# Patient Record
Sex: FEMALE | Race: BLACK OR AFRICAN AMERICAN | Employment: OTHER | URBAN - METROPOLITAN AREA
[De-identification: names, ages, dates, MRNs, and addresses within clinical notes are randomized per-mention and may not be internally consistent; named-entity substitution may affect disease eponyms.]

---

## 2017-02-23 ENCOUNTER — HOSPITAL ENCOUNTER (OUTPATIENT)
Dept: MRI IMAGING | Age: 72
Discharge: HOME | End: 2017-02-23
Attending: NURSE PRACTITIONER
Payer: MEDICARE

## 2017-02-23 ENCOUNTER — HOSPITAL ENCOUNTER (OUTPATIENT)
Dept: LAB | Age: 72
Discharge: HOME | End: 2017-02-23
Attending: NURSE PRACTITIONER
Payer: MEDICARE

## 2017-02-23 DIAGNOSIS — R55 NEAR SYNCOPE: ICD-10-CM

## 2017-02-23 DIAGNOSIS — R42 DIZZINESS: ICD-10-CM

## 2017-02-23 LAB
ANION GAP BLD CALC-SCNC: 12 MMOL/L (ref 7–16)
BASOPHILS # BLD AUTO: 0 K/UL (ref 0–0.2)
BASOPHILS # BLD: 0 % (ref 0–2)
BUN SERPL-MCNC: 12 MG/DL (ref 8–23)
CALCIUM SERPL-MCNC: 9.2 MG/DL (ref 8.3–10.4)
CHLORIDE SERPL-SCNC: 103 MMOL/L (ref 98–107)
CO2 SERPL-SCNC: 27 MMOL/L (ref 21–32)
CREAT SERPL-MCNC: 0.9 MG/DL (ref 0.6–1)
DIFFERENTIAL METHOD BLD: ABNORMAL
EOSINOPHIL # BLD: 0.1 K/UL (ref 0–0.8)
EOSINOPHIL NFR BLD: 2 % (ref 0.5–7.8)
ERYTHROCYTE [DISTWIDTH] IN BLOOD BY AUTOMATED COUNT: 13.2 % (ref 11.9–14.6)
GLUCOSE SERPL-MCNC: 132 MG/DL (ref 65–100)
HCT VFR BLD AUTO: 46.4 % (ref 35.8–46.3)
HGB BLD-MCNC: 15.7 G/DL (ref 11.7–15.4)
IMM GRANULOCYTES # BLD: 0 K/UL (ref 0–0.5)
IMM GRANULOCYTES NFR BLD AUTO: 0.2 % (ref 0–5)
LYMPHOCYTES # BLD AUTO: 37 % (ref 13–44)
LYMPHOCYTES # BLD: 2.2 K/UL (ref 0.5–4.6)
MCH RBC QN AUTO: 32.6 PG (ref 26.1–32.9)
MCHC RBC AUTO-ENTMCNC: 33.8 G/DL (ref 31.4–35)
MCV RBC AUTO: 96.5 FL (ref 79.6–97.8)
MONOCYTES # BLD: 0.2 K/UL (ref 0.1–1.3)
MONOCYTES NFR BLD AUTO: 3 % (ref 4–12)
NEUTS SEG # BLD: 3.5 K/UL (ref 1.7–8.2)
NEUTS SEG NFR BLD AUTO: 58 % (ref 43–78)
PLATELET # BLD AUTO: 219 K/UL (ref 150–450)
PMV BLD AUTO: 10.4 FL (ref 10.8–14.1)
POTASSIUM SERPL-SCNC: 4 MMOL/L (ref 3.5–5.1)
RBC # BLD AUTO: 4.81 M/UL (ref 4.05–5.25)
SODIUM SERPL-SCNC: 142 MMOL/L (ref 136–145)
T4 FREE SERPL-MCNC: 0.9 NG/DL (ref 0.78–1.46)
TSH SERPL DL<=0.005 MIU/L-ACNC: 0.82 UIU/ML (ref 0.36–3.74)
WBC # BLD AUTO: 6.1 K/UL (ref 4.3–11.1)

## 2017-02-23 PROCEDURE — 70551 MRI BRAIN STEM W/O DYE: CPT

## 2017-02-23 PROCEDURE — 84443 ASSAY THYROID STIM HORMONE: CPT | Performed by: NURSE PRACTITIONER

## 2017-02-23 PROCEDURE — 80048 BASIC METABOLIC PNL TOTAL CA: CPT | Performed by: NURSE PRACTITIONER

## 2017-02-23 PROCEDURE — 85025 COMPLETE CBC W/AUTO DIFF WBC: CPT | Performed by: NURSE PRACTITIONER

## 2017-02-23 PROCEDURE — 36415 COLL VENOUS BLD VENIPUNCTURE: CPT | Performed by: NURSE PRACTITIONER

## 2017-02-23 PROCEDURE — 84439 ASSAY OF FREE THYROXINE: CPT | Performed by: NURSE PRACTITIONER

## 2017-02-23 NOTE — PROGRESS NOTES
MRI shows no stroke. That does not mean that you didn't have a mini stroke. Those do not cause any permanent damage, and therefore sometimes cannot be seen on scans. The MRI does show that you have fluid build up behind your ear. This may represent a ear infection or it may always be this way. If you feel your ear is worse than it normally is, we could try a round of antibiotics. You let me know if you would like me to send some in for you. Otherwise, keep fu with gh next week to discuss a few more tests that we may need to do. Also discuss options to help you stop smoking. Let me know if you have any questions/concerns. Go to the ER immediately with any recurring or worsening sx.

## 2017-04-20 PROBLEM — J33.8 MAXILLARY POLYP OF SINUS: Status: ACTIVE | Noted: 2017-04-20

## 2017-04-20 PROBLEM — M54.32 LEFT SIDED SCIATICA: Status: ACTIVE | Noted: 2017-04-20

## 2017-11-30 ENCOUNTER — HOSPITAL ENCOUNTER (OUTPATIENT)
Age: 72
Setting detail: OBSERVATION
Discharge: HOME | End: 2017-11-30
Attending: EMERGENCY MEDICINE | Admitting: INTERNAL MEDICINE
Payer: MEDICARE

## 2017-11-30 ENCOUNTER — APPOINTMENT (OUTPATIENT)
Dept: GENERAL RADIOLOGY | Age: 72
End: 2017-11-30
Attending: EMERGENCY MEDICINE
Payer: MEDICARE

## 2017-11-30 VITALS
DIASTOLIC BLOOD PRESSURE: 87 MMHG | TEMPERATURE: 98.5 F | WEIGHT: 187 LBS | HEART RATE: 59 BPM | SYSTOLIC BLOOD PRESSURE: 142 MMHG | OXYGEN SATURATION: 97 % | HEIGHT: 66 IN | BODY MASS INDEX: 30.05 KG/M2 | RESPIRATION RATE: 16 BRPM

## 2017-11-30 DIAGNOSIS — R07.9 CHEST PAIN, UNSPECIFIED TYPE: Primary | ICD-10-CM

## 2017-11-30 PROBLEM — Z86.73 HISTORY OF TIA (TRANSIENT ISCHEMIC ATTACK): Status: ACTIVE | Noted: 2017-11-30

## 2017-11-30 PROBLEM — E78.5 DYSLIPIDEMIA: Status: ACTIVE | Noted: 2017-11-30

## 2017-11-30 LAB
ALBUMIN SERPL-MCNC: 3.3 G/DL (ref 3.2–4.6)
ALBUMIN/GLOB SERPL: 0.8 {RATIO} (ref 1.2–3.5)
ALP SERPL-CCNC: 78 U/L (ref 50–136)
ALT SERPL-CCNC: 17 U/L (ref 12–65)
ANION GAP SERPL CALC-SCNC: 5 MMOL/L (ref 7–16)
AST SERPL-CCNC: 13 U/L (ref 15–37)
ATRIAL RATE: 60 BPM
BASOPHILS # BLD: 0 K/UL (ref 0–0.2)
BASOPHILS NFR BLD: 0 % (ref 0–2)
BILIRUB SERPL-MCNC: 0.6 MG/DL (ref 0.2–1.1)
BUN SERPL-MCNC: 12 MG/DL (ref 8–23)
CALCIUM SERPL-MCNC: 9.2 MG/DL (ref 8.3–10.4)
CALCULATED P AXIS, ECG09: 59 DEGREES
CALCULATED R AXIS, ECG10: -43 DEGREES
CALCULATED T AXIS, ECG11: 115 DEGREES
CHLORIDE SERPL-SCNC: 108 MMOL/L (ref 98–107)
CO2 SERPL-SCNC: 28 MMOL/L (ref 21–32)
CREAT SERPL-MCNC: 0.83 MG/DL (ref 0.6–1)
DIAGNOSIS, 93000: NORMAL
DIFFERENTIAL METHOD BLD: ABNORMAL
EOSINOPHIL # BLD: 0.1 K/UL (ref 0–0.8)
EOSINOPHIL NFR BLD: 2 % (ref 0.5–7.8)
ERYTHROCYTE [DISTWIDTH] IN BLOOD BY AUTOMATED COUNT: 14.3 % (ref 11.9–14.6)
GLOBULIN SER CALC-MCNC: 3.9 G/DL (ref 2.3–3.5)
GLUCOSE SERPL-MCNC: 77 MG/DL (ref 65–100)
HCT VFR BLD AUTO: 45.5 % (ref 35.8–46.3)
HGB BLD-MCNC: 15.6 G/DL (ref 11.7–15.4)
IMM GRANULOCYTES # BLD: 0 K/UL (ref 0–0.5)
IMM GRANULOCYTES NFR BLD AUTO: 0 % (ref 0–5)
LYMPHOCYTES # BLD: 2 K/UL (ref 0.5–4.6)
LYMPHOCYTES NFR BLD: 40 % (ref 13–44)
MCH RBC QN AUTO: 33.5 PG (ref 26.1–32.9)
MCHC RBC AUTO-ENTMCNC: 34.3 G/DL (ref 31.4–35)
MCV RBC AUTO: 97.6 FL (ref 79.6–97.8)
MONOCYTES # BLD: 0.3 K/UL (ref 0.1–1.3)
MONOCYTES NFR BLD: 5 % (ref 4–12)
NEUTS SEG # BLD: 2.6 K/UL (ref 1.7–8.2)
NEUTS SEG NFR BLD: 53 % (ref 43–78)
P-R INTERVAL, ECG05: 104 MS
PLATELET # BLD AUTO: 260 K/UL (ref 150–450)
PMV BLD AUTO: 10.8 FL (ref 10.8–14.1)
POTASSIUM SERPL-SCNC: 5 MMOL/L (ref 3.5–5.1)
PROT SERPL-MCNC: 7.2 G/DL (ref 6.3–8.2)
Q-T INTERVAL, ECG07: 466 MS
QRS DURATION, ECG06: 132 MS
QTC CALCULATION (BEZET), ECG08: 466 MS
RBC # BLD AUTO: 4.66 M/UL (ref 4.05–5.25)
SODIUM SERPL-SCNC: 141 MMOL/L (ref 136–145)
TROPONIN I SERPL-MCNC: <0.02 NG/ML (ref 0.02–0.05)
VENTRICULAR RATE, ECG03: 60 BPM
WBC # BLD AUTO: 4.9 K/UL (ref 4.3–11.1)

## 2017-11-30 PROCEDURE — 96366 THER/PROPH/DIAG IV INF ADDON: CPT | Performed by: EMERGENCY MEDICINE

## 2017-11-30 PROCEDURE — 77030015766

## 2017-11-30 PROCEDURE — 74011250636 HC RX REV CODE- 250/636

## 2017-11-30 PROCEDURE — 74011250636 HC RX REV CODE- 250/636: Performed by: NURSE PRACTITIONER

## 2017-11-30 PROCEDURE — 99284 EMERGENCY DEPT VISIT MOD MDM: CPT | Performed by: EMERGENCY MEDICINE

## 2017-11-30 PROCEDURE — 80053 COMPREHEN METABOLIC PANEL: CPT | Performed by: EMERGENCY MEDICINE

## 2017-11-30 PROCEDURE — 85025 COMPLETE CBC W/AUTO DIFF WBC: CPT | Performed by: EMERGENCY MEDICINE

## 2017-11-30 PROCEDURE — 71020 XR CHEST PA LAT: CPT

## 2017-11-30 PROCEDURE — C1894 INTRO/SHEATH, NON-LASER: HCPCS

## 2017-11-30 PROCEDURE — 74011636320 HC RX REV CODE- 636/320: Performed by: INTERNAL MEDICINE

## 2017-11-30 PROCEDURE — 74011250636 HC RX REV CODE- 250/636: Performed by: INTERNAL MEDICINE

## 2017-11-30 PROCEDURE — 84484 ASSAY OF TROPONIN QUANT: CPT | Performed by: EMERGENCY MEDICINE

## 2017-11-30 PROCEDURE — 74011000250 HC RX REV CODE- 250: Performed by: INTERNAL MEDICINE

## 2017-11-30 PROCEDURE — 99153 MOD SED SAME PHYS/QHP EA: CPT

## 2017-11-30 PROCEDURE — 93005 ELECTROCARDIOGRAM TRACING: CPT | Performed by: EMERGENCY MEDICINE

## 2017-11-30 PROCEDURE — 74011250637 HC RX REV CODE- 250/637: Performed by: EMERGENCY MEDICINE

## 2017-11-30 PROCEDURE — 77030029997 HC DEV COM RDL R BND TELE -B

## 2017-11-30 PROCEDURE — C1769 GUIDE WIRE: HCPCS

## 2017-11-30 PROCEDURE — 99218 HC RM OBSERVATION: CPT

## 2017-11-30 PROCEDURE — 99152 MOD SED SAME PHYS/QHP 5/>YRS: CPT

## 2017-11-30 PROCEDURE — 96365 THER/PROPH/DIAG IV INF INIT: CPT | Performed by: EMERGENCY MEDICINE

## 2017-11-30 PROCEDURE — 93458 L HRT ARTERY/VENTRICLE ANGIO: CPT

## 2017-11-30 RX ORDER — SODIUM CHLORIDE 0.9 % (FLUSH) 0.9 %
5-10 SYRINGE (ML) INJECTION AS NEEDED
Status: CANCELLED | OUTPATIENT
Start: 2017-11-30

## 2017-11-30 RX ORDER — PANTOPRAZOLE SODIUM 40 MG/1
40 TABLET, DELAYED RELEASE ORAL DAILY
Qty: 30 TAB | Refills: 1 | Status: SHIPPED | OUTPATIENT
Start: 2017-11-30 | End: 2018-02-26

## 2017-11-30 RX ORDER — PRAVASTATIN SODIUM 20 MG/1
40 TABLET ORAL
Status: CANCELLED | OUTPATIENT
Start: 2017-11-30

## 2017-11-30 RX ORDER — MORPHINE SULFATE 2 MG/ML
2 INJECTION, SOLUTION INTRAMUSCULAR; INTRAVENOUS
Status: CANCELLED | OUTPATIENT
Start: 2017-11-30

## 2017-11-30 RX ORDER — TIZANIDINE 2 MG/1
2 TABLET ORAL
Status: CANCELLED | OUTPATIENT
Start: 2017-11-30

## 2017-11-30 RX ORDER — ACETAMINOPHEN 500 MG
500 TABLET ORAL
Status: CANCELLED | OUTPATIENT
Start: 2017-11-30

## 2017-11-30 RX ORDER — HEPARIN SODIUM 5000 [USP'U]/ML
4000 INJECTION, SOLUTION INTRAVENOUS; SUBCUTANEOUS ONCE
Status: COMPLETED | OUTPATIENT
Start: 2017-11-30 | End: 2017-11-30

## 2017-11-30 RX ORDER — SODIUM CHLORIDE 9 MG/ML
75 INJECTION, SOLUTION INTRAVENOUS CONTINUOUS
Status: DISCONTINUED | OUTPATIENT
Start: 2017-11-30 | End: 2017-12-01 | Stop reason: HOSPADM

## 2017-11-30 RX ORDER — LISINOPRIL AND HYDROCHLOROTHIAZIDE 10; 12.5 MG/1; MG/1
1 TABLET ORAL DAILY
Status: CANCELLED | OUTPATIENT
Start: 2017-12-01

## 2017-11-30 RX ORDER — GUAIFENESIN 100 MG/5ML
324 LIQUID (ML) ORAL
Status: COMPLETED | OUTPATIENT
Start: 2017-11-30 | End: 2017-11-30

## 2017-11-30 RX ORDER — HEPARIN SODIUM 200 [USP'U]/100ML
3 INJECTION, SOLUTION INTRAVENOUS CONTINUOUS
Status: DISCONTINUED | OUTPATIENT
Start: 2017-11-30 | End: 2017-12-01 | Stop reason: HOSPADM

## 2017-11-30 RX ORDER — GUAIFENESIN 100 MG/5ML
81 LIQUID (ML) ORAL DAILY
Status: CANCELLED | OUTPATIENT
Start: 2017-12-01

## 2017-11-30 RX ORDER — SODIUM CHLORIDE 0.9 % (FLUSH) 0.9 %
5-10 SYRINGE (ML) INJECTION EVERY 8 HOURS
Status: CANCELLED | OUTPATIENT
Start: 2017-11-30

## 2017-11-30 RX ORDER — SODIUM CHLORIDE 9 MG/ML
75 INJECTION, SOLUTION INTRAVENOUS CONTINUOUS
Status: CANCELLED | OUTPATIENT
Start: 2017-11-30

## 2017-11-30 RX ORDER — FENTANYL CITRATE 50 UG/ML
25-50 INJECTION, SOLUTION INTRAMUSCULAR; INTRAVENOUS
Status: DISCONTINUED | OUTPATIENT
Start: 2017-11-30 | End: 2017-12-01 | Stop reason: HOSPADM

## 2017-11-30 RX ORDER — MIDAZOLAM HYDROCHLORIDE 1 MG/ML
.5-2 INJECTION, SOLUTION INTRAMUSCULAR; INTRAVENOUS
Status: DISCONTINUED | OUTPATIENT
Start: 2017-11-30 | End: 2017-12-01 | Stop reason: HOSPADM

## 2017-11-30 RX ORDER — FAMOTIDINE 20 MG/1
20 TABLET, FILM COATED ORAL 2 TIMES DAILY
Status: CANCELLED | OUTPATIENT
Start: 2017-11-30

## 2017-11-30 RX ORDER — TRAMADOL HYDROCHLORIDE 50 MG/1
50 TABLET ORAL
Status: CANCELLED | OUTPATIENT
Start: 2017-11-30

## 2017-11-30 RX ORDER — CLOPIDOGREL BISULFATE 75 MG/1
75 TABLET ORAL DAILY
Status: CANCELLED | OUTPATIENT
Start: 2017-12-01

## 2017-11-30 RX ORDER — NITROGLYCERIN 0.4 MG/1
0.4 TABLET SUBLINGUAL
Status: CANCELLED | OUTPATIENT
Start: 2017-11-30

## 2017-11-30 RX ORDER — HEPARIN SODIUM 5000 [USP'U]/100ML
12-25 INJECTION, SOLUTION INTRAVENOUS
Status: DISCONTINUED | OUTPATIENT
Start: 2017-11-30 | End: 2017-12-01 | Stop reason: HOSPADM

## 2017-11-30 RX ORDER — LIDOCAINE HYDROCHLORIDE 20 MG/ML
1-20 INJECTION, SOLUTION INFILTRATION; PERINEURAL
Status: DISCONTINUED | OUTPATIENT
Start: 2017-11-30 | End: 2017-12-01 | Stop reason: HOSPADM

## 2017-11-30 RX ADMIN — HEPARIN SODIUM 4000 UNITS: 5000 INJECTION, SOLUTION INTRAVENOUS; SUBCUTANEOUS at 11:16

## 2017-11-30 RX ADMIN — IOPAMIDOL 110 ML: 755 INJECTION, SOLUTION INTRAVENOUS at 14:22

## 2017-11-30 RX ADMIN — LIDOCAINE HYDROCHLORIDE 60 MG: 20 INJECTION, SOLUTION INFILTRATION; PERINEURAL at 14:08

## 2017-11-30 RX ADMIN — HEPARIN SODIUM 3 ML/HR: 200 INJECTION, SOLUTION INTRAVENOUS at 13:48

## 2017-11-30 RX ADMIN — SODIUM CHLORIDE 75 ML/HR: 900 INJECTION, SOLUTION INTRAVENOUS at 11:14

## 2017-11-30 RX ADMIN — MIDAZOLAM HYDROCHLORIDE 1 MG: 1 INJECTION, SOLUTION INTRAMUSCULAR; INTRAVENOUS at 14:02

## 2017-11-30 RX ADMIN — FENTANYL CITRATE 25 MCG: 50 INJECTION, SOLUTION INTRAMUSCULAR; INTRAVENOUS at 14:08

## 2017-11-30 RX ADMIN — HEPARIN SODIUM AND DEXTROSE 12 UNITS/KG/HR: 5000; 5 INJECTION INTRAVENOUS at 11:17

## 2017-11-30 RX ADMIN — FENTANYL CITRATE 25 MCG: 50 INJECTION, SOLUTION INTRAMUSCULAR; INTRAVENOUS at 14:02

## 2017-11-30 RX ADMIN — MIDAZOLAM HYDROCHLORIDE 1 MG: 1 INJECTION, SOLUTION INTRAMUSCULAR; INTRAVENOUS at 14:08

## 2017-11-30 RX ADMIN — HEPARIN SODIUM 2 ML: 10000 INJECTION, SOLUTION INTRAVENOUS; SUBCUTANEOUS at 14:08

## 2017-11-30 RX ADMIN — ASPIRIN 81 MG 324 MG: 81 TABLET ORAL at 10:33

## 2017-11-30 NOTE — ED PROVIDER NOTES
HPI:  74 female history of COPD, hypertension, hyperlipidemia and similar complaint of intermittent chest pain for the past one month. No direct correlation with exertion. Sometimes at rest sometime with exertion. No nausea vomiting, diarrhea. Denies any pleuritic component. No coughing, recent travel. No burn in sensation when laying down or first thing in the morning. Currently chest pain-free however had chest pain earlier this morning. Felt like a \"twinge\"    ROS  Constitutional: No fever  Skin: no rash  Eye: No vision changes  ENMT: No sore throat, no congestion  Respiratory: No shortness of breath, no cough  Cardiovascular: + chest pain, no palpitations  Gastrointestinal: No vomiting, no nausea, no diarrhea, no constipation, no rectal bleeding, no abdominal pain  : No dysuria, no hematuria  MSK: No back pain, no muscle pain, no joint pain  Neuro: No headache, no change in mental status, no numbness, no tingling, no weakness  Psych: No anxiety, no depression  Endocrine: No hyperglycemia  All other review of systems positive per history of present illness and the above otherwise negative or noncontributory.     Visit Vitals    /87 (BP 1 Location: Left arm, BP Patient Position: At rest)    Pulse 62    Temp 98.5 °F (36.9 °C)    Resp 20    Ht 5' 6\" (1.676 m)    Wt 84.8 kg (187 lb)    SpO2 98%    BMI 30.18 kg/m2     Past Medical History:   Diagnosis Date    Anxiety state, unspecified     Breast cancer ([address]     Chronic obstructive pulmonary disease (HCC)     CTS (carpal tunnel syndrome) 6/09/4351    Diastolic dysfunction     Diverticulosis     Helicobacter pylori (H. pylori) 07/27/2012    Hypertension     Mixed hyperlipidemia     Myalgia and myositis, unspecified     Obesity     Overweight(278.02)     Palpitations     Pulmonary hypertension due to COPD ([address]     Rectal polyp     Stroke ([address]     x2    TIA (transient ischemic attack)     Tobacco use disorder     Type A WPW syndrome      Past Surgical History:   Procedure Laterality Date    HX HYSTERECTOMY      Vaginal for fibroid tumors    HX MASTECTOMY Bilateral     Breast cancer    HX OTHER SURGICAL      Thyroid     Prior to Admission Medications   Prescriptions Last Dose Informant Patient Reported? Taking?   acetaminophen (TYLENOL EXTRA STRENGTH) 500 mg tablet   Yes No   Sig: Take 500 mg by mouth every six (6) hours as needed. clopidogrel (PLAVIX) 75 mg tab   No No   Sig: TAKE 1 TABLET EVERY DAY   fluticasone (FLONASE) 50 mcg/actuation nasal spray   No No   Si Sprays by Both Nostrils route daily. lisinopril-hydroCHLOROthiazide (PRINZIDE, ZESTORETIC) 10-12.5 mg per tablet   No No   Sig: TAKE 1 TABLET EVERY DAY   loratadine (CLARITIN) 10 mg tablet   Yes No   Sig: Take 10 mg by mouth daily as needed. pravastatin (PRAVACHOL) 40 mg tablet   No No   Sig: TAKE 1 TABLET  NIGHTLY   raNITIdine (ZANTAC) 150 mg tablet   No No   Sig: Take 1 Tab by mouth two (2) times daily as needed. tiZANidine (ZANAFLEX) 2 mg tablet   No No   Sig: Take 1 Tab by mouth two (2) times daily as needed. traMADol (ULTRAM) 50 mg tablet   No No   Sig: Take 1 Tab by mouth every six (6) hours as needed for Pain. Max Daily Amount: 200 mg. Facility-Administered Medications: None         Adult Exam   General: alert, no acute distress  Head: normocephalic, atraumatic  ENT: moist mucous membranes  Neck: supple, non-tender; full range of motion  Cardiovascular: regular rate and rhythm, normal peripheral perfusion, no edema.  Equal radial pulses   Respiratory: lungs are clear to auscultation; normal respirations; no wheezing, rales or rhonchi  Gastrointestinal: soft, non-tender; no rebound or guarding, no peritoneal signs, no distension  Back: non-tender, full range of motion  Musculoskeletal: normal range of motion, normal strength, no gross deformities  Neurological: alert and oriented x 4, no gross focal deficits; normal speech  Psychiatric: cooperative; appropriate mood and affect    MDM: EKG obtained interpreted by me rate of 60. Shortened TN interval with upsloping QRS. Concern for WPW. No STEMI noted. Nonspecific T-wave changes. Troponin was elevated 3 days ago at 0.12. It is negative today. We'll give 4 baby aspirin. Has never been seen by a cardiologist before which is somewhat interested since  Her chart shows that she has a diagnosis of WPW. Her first appointment with [facility] cardiology as an December. Recommend admission. She agrees. Spoke with the cardiology team.  Patient will be admitted for further evaluation. Recent Results (from the past 8 hour(s))   EKG, 12 LEAD, INITIAL    Collection Time: 11/30/17  9:32 AM   Result Value Ref Range    Ventricular Rate 60 BPM    Atrial Rate 60 BPM    P-R Interval 104 ms    QRS Duration 132 ms    Q-T Interval 466 ms    QTC Calculation (Bezet) 466 ms    Calculated P Axis 59 degrees    Calculated R Axis -43 degrees    Calculated T Axis 115 degrees    Diagnosis       Normal sinus rhythm  Jay-Parkinson-White  Abnormal ECG  When compared with ECG of 09-JAN-2016 20:31,  Jay-Parkinson-White is now Present     CBC WITH AUTOMATED DIFF    Collection Time: 11/30/17  9:40 AM   Result Value Ref Range    WBC 4.9 4.3 - 11.1 K/uL    RBC 4.66 4.05 - 5.25 M/uL    HGB 15.6 (H) 11.7 - 15.4 g/dL    HCT 45.5 35.8 - 46.3 %    MCV 97.6 79.6 - 97.8 FL    MCH 33.5 (H) 26.1 - 32.9 PG    MCHC 34.3 31.4 - 35.0 g/dL    RDW 14.3 11.9 - 14.6 %    PLATELET 490 835 - 243 K/uL    MPV 10.8 10.8 - 14.1 FL    DF AUTOMATED      NEUTROPHILS 53 43 - 78 %    LYMPHOCYTES 40 13 - 44 %    MONOCYTES 5 4.0 - 12.0 %    EOSINOPHILS 2 0.5 - 7.8 %    BASOPHILS 0 0.0 - 2.0 %    IMMATURE GRANULOCYTES 0 0.0 - 5.0 %    ABS. NEUTROPHILS 2.6 1.7 - 8.2 K/UL    ABS. LYMPHOCYTES 2.0 0.5 - 4.6 K/UL    ABS. MONOCYTES 0.3 0.1 - 1.3 K/UL    ABS. EOSINOPHILS 0.1 0.0 - 0.8 K/UL    ABS. BASOPHILS 0.0 0.0 - 0.2 K/UL    ABS. IMM.  GRANS. 0.0 0.0 - 0.5 K/UL   METABOLIC PANEL, COMPREHENSIVE    Collection Time: 11/30/17  9:40 AM   Result Value Ref Range    Sodium 141 136 - 145 mmol/L    Potassium 5.0 3.5 - 5.1 mmol/L    Chloride 108 (H) 98 - 107 mmol/L    CO2 28 21 - 32 mmol/L    Anion gap 5 (L) 7 - 16 mmol/L    Glucose 77 65 - 100 mg/dL    BUN 12 8 - 23 MG/DL    Creatinine 0.83 0.6 - 1.0 MG/DL    GFR est AA >60 >60 ml/min/1.73m2    GFR est non-AA >60 >60 ml/min/1.73m2    Calcium 9.2 8.3 - 10.4 MG/DL    Bilirubin, total 0.6 0.2 - 1.1 MG/DL    ALT (SGPT) 17 12 - 65 U/L    AST (SGOT) 13 (L) 15 - 37 U/L    Alk. phosphatase 78 50 - 136 U/L    Protein, total 7.2 6.3 - 8.2 g/dL    Albumin 3.3 3.2 - 4.6 g/dL    Globulin 3.9 (H) 2.3 - 3.5 g/dL    A-G Ratio 0.8 (L) 1.2 - 3.5     TROPONIN I    Collection Time: 11/30/17  9:40 AM   Result Value Ref Range    Troponin-I, Qt. <0.02 (L) 0.02 - 0.05 NG/ML       Xr Chest Pa Lat    Result Date: 11/30/2017  Two view chest:  11/30/2017 History: Left-sided chest pain x2 months which is intermittent. Comparison: 01/09/2017 Findings: The heart is at the upper limits of normal. There is mild left upper lung zone subsegmental atelectasis or scar which has developed. There are no pleural effusions. The pulmonary vascularity is within normal limits. The visualized osseous structures are unremarkable. Surgical clips overlie the right axilla. Impression: Minor left upper lung zone subsegmental atelectasis or scar. Dragon voice recognition software was used to create this note. Although the note has been reviewed and corrected where necessary, additional errors may have been overlooked and remain in the text.

## 2017-11-30 NOTE — ED AVS SNAPSHOT
[facility] [place] 
 
 
 [address] 
[phone] Patient: Ramsey Motley MRN: [id number] EKK:[birth year] You are allergic to the following Allergen Reactions Aspirin Nausea and Vomiting Bee Sting (Sting, Bee) Unknown (comments) Naprosyn (Naproxen) Other (comments) Abdominal pain Recent Documentation Height Weight BMI OB Status Smoking Status 1.676 m 84.8 kg 30.18 kg/m2 Hysterectomy Current Some Day Smoker Unresulted Labs-Please follow up with your PCP about these lab tests Order Current Status EKG, 12 LEAD, INITIAL Preliminary result Emergency Contacts  (Rel.) Home Phone Work Phone Mobile Phone Domenico Ying (Sister) [phone] -- [phone] About your hospitalization You were admitted on:  November 30, 2017 You last received care in the:  [facility] EMERGENCY DEPT You were discharged on:  November 30, 2017 Why you were hospitalized Your primary diagnosis was:  Chest Pain Your diagnoses also included:  Type A Wpw Syndrome, Hypertension, Tobacco Use Disorder, History Of Tia (Transient Ischemic Attack), Dyslipidemia Providers Seen During Your Hospitalization Provider Specialty Primary office phone Norman Phillips MD Emergency Medicine [phone] Your Primary Care Physician (PCP) Primary Care Physician Office Phone Office Fax Merly Santos [phone] Follow-up Information Follow up With Details Comments Contact Info Mauricio Sow MD   [address] [facility] [place] [address] [phone] Abhijeet Terry MD  Dec 18 @ 9:00 in [facility] office  [address] [place] [address] 
[phone] My Medications TAKE these medications as instructed Instructions Each Dose to Equal  
 Morning Noon Evening Bedtime clopidogrel 75 mg Tab Commonly known as:  PLAVIX Your last dose was: Your next dose is: TAKE 1 TABLET EVERY DAY  
     
   
   
   
  
 fluticasone 50 mcg/actuation nasal spray Commonly known as:  Miracle Torres Your last dose was: Your next dose is: 2 Sprays by Both Nostrils route daily. 2 Spray  
    
   
   
   
  
 lisinopril-hydroCHLOROthiazide 10-12.5 mg per tablet Commonly known as:  Mikki Aguilar Your last dose was: Your next dose is: TAKE 1 TABLET EVERY DAY  
     
   
   
   
  
 loratadine 10 mg tablet Commonly known as:  Lyons Christopher Your last dose was: Your next dose is: Take 10 mg by mouth daily as needed. 10 mg  
    
   
   
   
  
 pantoprazole 40 mg tablet Commonly known as:  PROTONIX Your last dose was: Your next dose is: Take 1 Tab by mouth daily. 40 mg  
    
   
   
   
  
 pravastatin 40 mg tablet Commonly known as:  PRAVACHOL Your last dose was: Your next dose is: TAKE 1 TABLET  NIGHTLY  
     
   
   
   
  
 raNITIdine 150 mg tablet Commonly known as:  ZANTAC Your last dose was: Your next dose is: Take 1 Tab by mouth two (2) times daily as needed. 150 mg  
    
   
   
   
  
 tiZANidine 2 mg tablet Commonly known as:  Anuja Hurleyon Your last dose was: Your next dose is: Take 1 Tab by mouth two (2) times daily as needed. 2 mg  
    
   
   
   
  
 traMADol 50 mg tablet Commonly known as:  ULTRAM  
   
Your last dose was: Your next dose is: Take 1 Tab by mouth every six (6) hours as needed for Pain. Max Daily Amount: 200 mg.  
 50 mg  
    
   
   
   
  
 TYLENOL EXTRA STRENGTH 500 mg tablet Generic drug:  acetaminophen Your last dose was: Your next dose is: Take 500 mg by mouth every six (6) hours as needed. 500 mg Where to Get Your Medications Information on where to get these meds will be given to you by the nurse or doctor. ! Ask your nurse or doctor about these medications  
  pantoprazole 40 mg tablet Discharge Instructions HEART CATHETERIZATION/ANGIOGRAPHY DISCHARGE INSTRUCTIONS 1. Check puncture site frequently for swelling or bleeding. If there is any bleeding, apply pressure over the area with a clean towel or washcloth. If you are unable to stop the bleeding in 15-20 minutes call 911. Notify your doctor for any redness, swelling, drainage, or oozing from the puncture site. Notify your doctor for any fever, chills or other signs of infection. 2. If the extremity becomes cold, numb, or painful call [facility] Cardiology at [phone]. 
3. Activity should be limited for the next 48 hours. Avoid pushing, pulling, or strenuous activity for 48 hours. No heavy lifting (anything over 5 pounds) for 3 days. No driving for 48 hours. 4. You may resume your usual diet. Drink more fluids than usual, water is best. 
5. Have a responsible person drive you home and stay with you for at least 24 hours after your heart catheterization/angiography. 6. You may remove bandage from your right wrist in 24 hours. You may shower in 24 hours. No tub baths, hot tubs, or swimming for 1 week. Do not wash dishes for 1 week. Do not place any lotions, creams, powders, or ointments over puncture site for 1 week. You may place a clean band-aid over the puncture site each day for 5 days. Change daily. I have read the above instructions and have had the opportunity to ask questions. Discharge Orders None [facility] Announcement We are excited to announce that we are making your provider's discharge notes available to you in Air[employer].   You will see these notes when they are completed and signed by the physician that discharged you from your recent hospital stay. If you have any questions or concerns about any information you see in Kojami, please call the Health Information Department where you were seen or reach out to your Primary Care Provider for more information about your plan of care. Introducing [facility]! Leticia Rizzo introduces Kojami patient portal. Now you can access parts of your medical record, email your doctor's office, and request medication refills online. 1. In your internet browser, go to [email or web address] 2. Click on the First Time User? Click Here link in the Sign In box. You will see the New Member Sign Up page. 3. Enter your Kojami Access Code exactly as it appears below. You will not need to use this code after youve completed the sign-up process. If you do not sign up before the expiration date, you must request a new code. · Kojami Access Code: [email or web address] Expires: 1/1/2018  2:39 PM 
 
4. Enter the last four digits of your Social Security Number (xxxx) and Date of Birth (mm/dd/yyyy) as indicated and click Submit. You will be taken to the next sign-up page. 5. Create a Kojami ID. This will be your Kojami login ID and cannot be changed, so think of one that is secure and easy to remember. 6. Create a Kojami password. You can change your password at any time. 7. Enter your Password Reset Question and Answer. This can be used at a later time if you forget your password. 8. Enter your e-mail address. You will receive e-mail notification when new information is available in [address] 9. Click Sign Up. You can now view and download portions of your medical record. 10. Click the Download Summary menu link to download a portable copy of your medical information.  
 
If you have questions, please visit the Frequently Asked Questions section of the [facility]. Remember, MyChart is NOT to be used for urgent needs. For medical emergencies, dial 911. Now available from your iPhone and Android! General Information Please provide this summary of care documentation to your next provider. Patient Signature:  ____________________________________________________________ Date:  ____________________________________________________________  
  
Joelene Batman Provider Signature:  ____________________________________________________________ Date:  ____________________________________________________________

## 2017-11-30 NOTE — PROGRESS NOTES
TRANSFER - OUT REPORT:    Verbal report given to Prince Deirdre RN on [address]  being transferred to [address] [place] [address] for routine progression of care       Report consisted of patients Situation, Background, Assessment and Recommendations(SBAR). Information from the following report(s) SBAR, Kardex, Procedure Summary and MAR was reviewed with the receiving nurse. Opportunity for questions and clarification was provided.       [facility] with Dr Shailesh Garibay  No intervention  2 versed  50 fentanyl  Right radial R band 10ml at [phone] [id number]

## 2017-11-30 NOTE — DISCHARGE SUMMARY
Physician Discharge Summary     Patient ID:  Feliz Yepez  [id number]  74 y.o.  1945    Admit date: 11/30/2017    Discharge date and time: 11/30/17    Admitting Physician: Lynn Cassidy MD     Primary Cardiologist:none    Primary Care MD:Helio Holden MD    Discharge Physician: Patty Hearn NP    Admission Diagnoses: Chest pain    Discharge Diagnoses:   Patient Active Problem List    Diagnosis Date Noted    Chest pain 11/30/2017    History of TIA (transient ischemic attack) 11/30/2017    Dyslipidemia 11/30/2017    Left sided sciatica 04/20/2017    Maxillary polyp of sinus 04/20/2017    Feeling faint 10/05/2016    CTS (carpal tunnel syndrome) 06/25/2015    Anxiety state, unspecified     Type A WPW syndrome     Palpitations     Diastolic dysfunction     Hypertension     [address]monary hypertension due to COPD [address]     Tobacco use disorder     TIA (transient ischemic attack)     Mixed hyperlipidemia            Tabatha Haynes is a 74 y. 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 Had troponin drawn as directed by Dr. Tricia Holden on 11/27--noted to be 0.12, and was directed to ER for further evaluation. She is currently CP free with negative troponin. ECG is consistent for WPW with short MI, slurring or QRS--present in ECG in 2016. She denies associated tachypalpitaions,  nausea, vomiting, diaphoresis or SOB with chest discomfort , +  Left shoulder discomfort. Never seen a cardiologist, no prior stress testing or caths. No family members with premature CAD. + hx of TIAs on plavix. Pt was taken to cardiac cath lab for definitive evaluation of her coronary anatomy. Fortunately she had no evidence of obstructive CAD. She did however note to have mild Ascending aortic dilation. She will need this followed in future.  Will arrange follow up with EP given WPW hx. Protonix added to Rx for possible GI referred chest pain. Discharge Exam:     Visit Vitals    BP (!) 155/91    Pulse 61    Temp 98.5 °F (36.9 °C)    Resp 16    Ht 5' 6\" (1.676 m)    Wt 84.8 kg (187 lb)    SpO2 97%    BMI 30.18 kg/m2     General Appearance:  Well developed, well nourished,alert and oriented x 3, and individual in no acute distress. Ears/Nose/Mouth/Throat:   Hearing grossly normal.         Neck: Supple. Chest:   Lungs clear to auscultation bilaterally. Cardiovascular:  Regular rate and rhythm, S1, S2 normal, no murmur. Abdomen:   Soft, non-tender, bowel sounds are active. Extremities: No edema bilaterally. Skin: Warm and dry. Final Laboratory Data:Recent Results (from the past 24 hour(s))   EKG, 12 LEAD, INITIAL    Collection Time: 11/30/17  9:32 AM   Result Value Ref Range    Ventricular Rate 60 BPM    Atrial Rate 60 BPM    P-R Interval 104 ms    QRS Duration 132 ms    Q-T Interval 466 ms    QTC Calculation (Bezet) 466 ms    Calculated P Axis 59 degrees    Calculated R Axis -43 degrees    Calculated T Axis 115 degrees    Diagnosis       Normal sinus rhythm  Jay-Parkinson-White  Abnormal ECG  When compared with ECG of 09-JAN-2016 20:31,  Jay-Parkinson-White is now Present     CBC WITH AUTOMATED DIFF    Collection Time: 11/30/17  9:40 AM   Result Value Ref Range    WBC 4.9 4.3 - 11.1 K/uL    RBC 4.66 4.05 - 5.25 M/uL    HGB 15.6 (H) 11.7 - 15.4 g/dL    HCT 45.5 35.8 - 46.3 %    MCV 97.6 79.6 - 97.8 FL    MCH 33.5 (H) 26.1 - 32.9 PG    MCHC 34.3 31.4 - 35.0 g/dL    RDW 14.3 11.9 - 14.6 %    PLATELET 343 670 - 545 K/uL    MPV 10.8 10.8 - 14.1 FL    DF AUTOMATED      NEUTROPHILS 53 43 - 78 %    LYMPHOCYTES 40 13 - 44 %    MONOCYTES 5 4.0 - 12.0 %    EOSINOPHILS 2 0.5 - 7.8 %    BASOPHILS 0 0.0 - 2.0 %    IMMATURE GRANULOCYTES 0 0.0 - 5.0 %    ABS. NEUTROPHILS 2.6 1.7 - 8.2 K/UL    ABS.  LYMPHOCYTES 2.0 0.5 - 4.6 K/UL ABS. MONOCYTES 0.3 0.1 - 1.3 K/UL    ABS. EOSINOPHILS 0.1 0.0 - 0.8 K/UL    ABS. BASOPHILS 0.0 0.0 - 0.2 K/UL    ABS. IMM. GRANS. 0.0 0.0 - 0.5 K/UL   METABOLIC PANEL, COMPREHENSIVE    Collection Time: 11/30/17  9:40 AM   Result Value Ref Range    Sodium 141 136 - 145 mmol/L    Potassium 5.0 3.5 - 5.1 mmol/L    Chloride 108 (H) 98 - 107 mmol/L    CO2 28 21 - 32 mmol/L    Anion gap 5 (L) 7 - 16 mmol/L    Glucose 77 65 - 100 mg/dL    BUN 12 8 - 23 MG/DL    Creatinine 0.83 0.6 - 1.0 MG/DL    GFR est AA >60 >60 ml/min/1.73m2    GFR est non-AA >60 >60 ml/min/1.73m2    Calcium 9.2 8.3 - 10.4 MG/DL    Bilirubin, total 0.6 0.2 - 1.1 MG/DL    ALT (SGPT) 17 12 - 65 U/L    AST (SGOT) 13 (L) 15 - 37 U/L    Alk. phosphatase 78 50 - 136 U/L    Protein, total 7.2 6.3 - 8.2 g/dL    Albumin 3.3 3.2 - 4.6 g/dL    Globulin 3.9 (H) 2.3 - 3.5 g/dL    A-G Ratio 0.8 (L) 1.2 - 3.5     TROPONIN I    Collection Time: 11/30/17  9:40 AM   Result Value Ref Range    Troponin-I, Qt. <0.02 (L) 0.02 - 0.05 NG/ML       Disposition: home    Patient Instructions:   Current Discharge Medication List      START taking these medications    Details   pantoprazole (PROTONIX) 40 mg tablet Take 1 Tab by mouth daily. Qty: 30 Tab, Refills: 1         CONTINUE these medications which have NOT CHANGED    Details   traMADol (ULTRAM) 50 mg tablet Take 1 Tab by mouth every six (6) hours as needed for Pain. Max Daily Amount: 200 mg. Qty: 30 Tab, Refills: 0      tiZANidine (ZANAFLEX) 2 mg tablet Take 1 Tab by mouth two (2) times daily as needed. Qty: 30 Tab, Refills: 0      fluticasone (FLONASE) 50 mcg/actuation nasal spray 2 Sprays by Both Nostrils route daily.   Qty: 1 Bottle, Refills: 6      clopidogrel (PLAVIX) 75 mg tab TAKE 1 TABLET EVERY DAY  Qty: 90 Tab, Refills: 3      lisinopril-hydroCHLOROthiazide (PRINZIDE, ZESTORETIC) 10-12.5 mg per tablet TAKE 1 TABLET EVERY DAY  Qty: 90 Tab, Refills: 3      pravastatin (PRAVACHOL) 40 mg tablet TAKE 1 TABLET NIGHTLY  Qty: 90 Tab, Refills: 3      raNITIdine (ZANTAC) 150 mg tablet Take 1 Tab by mouth two (2) times daily as needed. Qty: 180 Tab, Refills: 3      loratadine (CLARITIN) 10 mg tablet Take 10 mg by mouth daily as needed. Qty: 30 Tab, Refills: 3    Associated Diagnoses: Eustachian tube dysfunction, right      acetaminophen (TYLENOL EXTRA STRENGTH) 500 mg tablet Take 500 mg by mouth every six (6) hours as needed. Referenced discharge instructions provided by nursing for diet and activity. Follow-up:  Primary Cardiologist:Dr. Shaunna [facility]. PCP: Deonte Gonzales MD) in about 4 weeks. Signed:  Maryhelen Angelucci, NP  11/30/2017  2:37 PM            ATTENDING ADDENDUM:    Patient seen and examined by me. Agree with above note by physician extender. Key findings are:  No CP or VIEIRA, cath without significant CAD, treat risk factors, add PPI, follow up as outpatient. CV- RRR without murmur  Lungs- Clear bilaterally  Abd- soft, nontender, nondistended  Ext- no edema    Plan: As above.     Mitali Hughes MD  [facility] Cardiology  Pager [phone]

## 2017-11-30 NOTE — DISCHARGE INSTRUCTIONS

## 2017-11-30 NOTE — H&P
[facility] Cardiology H&P    Admitting Cardiologist:Dr. Miladys Regalado    Primary Cardiologist:none    Primary Care Physician:Dr. Davis Baumgarten    Subjective:     Laverne Hill is a 74 y. 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 Had troponin drawn as directed by Dr. Davis Baumgarten on 11/27--noted to be 0.12, and was directed to ER for further evaluation. She is currently CP free with negative troponin. ECG is consistent for WPW with short VA, slurring or QRS--present in ECG in 2016. She denies associated tachypalpitaions,  nausea, vomiting, diaphoresis or SOB with chest discomfort , +  Left shoulder discomfort. Never seen a cardiologist, no prior stress testing or caths. No family members with premature CAD. + hx of TIAs on plavix.      Past Medical History:   Diagnosis Date    Anxiety state, unspecified     Breast cancer ([address]     Chronic obstructive pulmonary disease (HCC)     CTS (carpal tunnel syndrome) 1/24/3711    Diastolic dysfunction     Diverticulosis     Helicobacter pylori (H. pylori) 07/27/2012    Hypertension     Mixed hyperlipidemia     Myalgia and myositis, unspecified     Obesity     Overweight(278.02)     Palpitations     Pulmonary hypertension due to COPD ([address]     Rectal polyp     Stroke ([facility]     x2    TIA (transient ischemic attack)     Tobacco use disorder     Type A WPW syndrome       Past Surgical History:   Procedure Laterality Date    HX HYSTERECTOMY  1976    Vaginal for fibroid tumors    HX MASTECTOMY Bilateral 1995    Breast cancer    HX OTHER SURGICAL      Thyroid      Current Facility-Administered Medications   Medication Dose Route Frequency    heparin (porcine) injection 4,000 Units  4,000 Units IntraVENous ONCE    heparin 25,000 units in dextrose 500 mL infusion  12-25 Units/kg/hr IntraVENous TITRATE     Current Outpatient Prescriptions   Medication Sig    traMADol (ULTRAM) 50 mg tablet Take 1 Tab by mouth every six (6) hours as needed for Pain. Max Daily Amount: 200 mg.  tiZANidine (ZANAFLEX) 2 mg tablet Take 1 Tab by mouth two (2) times daily as needed.  fluticasone (FLONASE) 50 mcg/actuation nasal spray 2 Sprays by Both Nostrils route daily.  clopidogrel (PLAVIX) 75 mg tab TAKE 1 TABLET EVERY DAY    lisinopril-hydroCHLOROthiazide (PRINZIDE, ZESTORETIC) 10-12.5 mg per tablet TAKE 1 TABLET EVERY DAY    pravastatin (PRAVACHOL) 40 mg tablet TAKE 1 TABLET  NIGHTLY    raNITIdine (ZANTAC) 150 mg tablet Take 1 Tab by mouth two (2) times daily as needed.  loratadine (CLARITIN) 10 mg tablet Take 10 mg by mouth daily as needed.  acetaminophen (TYLENOL EXTRA STRENGTH) 500 mg tablet Take 500 mg by mouth every six (6) hours as needed. Allergies   Allergen Reactions    Aspirin Nausea and Vomiting    Bee Sting [Sting, Bee] Unknown (comments)    Naprosyn [Naproxen] Other (comments)     Abdominal pain      Social History   Substance Use Topics    Smoking status: Current Some Day Smoker     Packs/day: 0.50     Years: 20.00    Smokeless tobacco: Never Used    Alcohol use Yes      Comment: Soc      Family History   Problem Relation Age of Onset    Cancer Father     Breast Cancer Maternal Aunt         Review of Systems  Gen: Denies fever, chills, malaise or fatigue. Appetite good. HEENT: Denies frequent headaches, dizzyness, visual disturbances, Neck pain or swallowing difficulty  Lungs: Denies shortness of breath, hx of COPD, breathing problems  Cardiovascular: as above. GI: Denies hememesis, dark tarry stools, No prior Hx of GI bleed, Denies constipation  : Denies dysuria, no complaints of frequency, nocturia  Heme: No prior bleeding disorders, no prior Cancer  Neuro: + hx of TIA  Endocrine: no diabetes, thyroid disorders  Psychiatric: Denies anxiety, or other psychiatric illnesses.      Objective:     Visit Vitals  /87 (BP 1 Location: Left arm, BP Patient Position: At rest)    Pulse 62    Temp 98.5 °F (36.9 °C)    Resp 20    Ht 5' 6\" (1.676 m)    Wt 84.8 kg (187 lb)    SpO2 98%    BMI 30.18 kg/m2     General:Alert, cooperative, no distress, appears stated age  Head: Normocephalic, without obvious abnormality, atraumatic. Eyes: Conjunctivae/corneas clear. PERRL, EOMs intact  Nose:Nares normal. Septum midline. Mucosa normal. No drainage or sinus tenderness. Throat: Lips, mucosa, and tongue normal. Teeth and gums normal.   Neck: Supple, symmetrical, trachea midline,  no carotid bruit and no JVD. Lungs:Clear to auscultation bilaterally. Chest wall: No tenderness or deformity. Heart: Regular rate and rhythm, S1, S2 normal, no murmur, click, rub or gallop. Abdomen:Soft, non-tender. Bowel sounds normal. No masses, No organomegaly. Extremities: Extremities normal, atraumatic, no cyanosis or edema. Pulses: 2+ and symmetric all extremities.     Skin: Skin color, texture, turgor normal. No rashes or lesions  Lymph nodes: Cervical, supraclavicular, and axillary nodes normal  Neurologic:No focal deficits identified           Data Review:     Recent Results (from the past 24 hour(s))   EKG, 12 LEAD, INITIAL    Collection Time: 11/30/17  9:32 AM   Result Value Ref Range    Ventricular Rate 60 BPM    Atrial Rate 60 BPM    P-R Interval 104 ms    QRS Duration 132 ms    Q-T Interval 466 ms    QTC Calculation (Bezet) 466 ms    Calculated P Axis 59 degrees    Calculated R Axis -43 degrees    Calculated T Axis 115 degrees    Diagnosis       Normal sinus rhythm  Jay-Parkinson-White  Abnormal ECG  When compared with ECG of 09-JAN-2016 20:31,  Jay-Parkinson-White is now Present     CBC WITH AUTOMATED DIFF    Collection Time: 11/30/17  9:40 AM   Result Value Ref Range    WBC 4.9 4.3 - 11.1 K/uL    RBC 4.66 4.05 - 5.25 M/uL    HGB 15.6 (H) 11.7 - 15.4 g/dL    HCT 45.5 35.8 - 46.3 %    MCV 97.6 79.6 - 97.8 FL    MCH 33. 5 (H) 26.1 - 32.9 PG    MCHC 34.3 31.4 - 35.0 g/dL    RDW 14.3 11.9 - 14.6 %    PLATELET 370 303 - 075 K/uL    MPV 10.8 10.8 - 14.1 FL    DF AUTOMATED      NEUTROPHILS 53 43 - 78 %    LYMPHOCYTES 40 13 - 44 %    MONOCYTES 5 4.0 - 12.0 %    EOSINOPHILS 2 0.5 - 7.8 %    BASOPHILS 0 0.0 - 2.0 %    IMMATURE GRANULOCYTES 0 0.0 - 5.0 %    ABS. NEUTROPHILS 2.6 1.7 - 8.2 K/UL    ABS. LYMPHOCYTES 2.0 0.5 - 4.6 K/UL    ABS. MONOCYTES 0.3 0.1 - 1.3 K/UL    ABS. EOSINOPHILS 0.1 0.0 - 0.8 K/UL    ABS. BASOPHILS 0.0 0.0 - 0.2 K/UL    ABS. IMM. GRANS. 0.0 0.0 - 0.5 K/UL   METABOLIC PANEL, COMPREHENSIVE    Collection Time: 11/30/17  9:40 AM   Result Value Ref Range    Sodium 141 136 - 145 mmol/L    Potassium 5.0 3.5 - 5.1 mmol/L    Chloride 108 (H) 98 - 107 mmol/L    CO2 28 21 - 32 mmol/L    Anion gap 5 (L) 7 - 16 mmol/L    Glucose 77 65 - 100 mg/dL    BUN 12 8 - 23 MG/DL    Creatinine 0.83 0.6 - 1.0 MG/DL    GFR est AA >60 >60 ml/min/1.73m2    GFR est non-AA >60 >60 ml/min/1.73m2    Calcium 9.2 8.3 - 10.4 MG/DL    Bilirubin, total 0.6 0.2 - 1.1 MG/DL    ALT (SGPT) 17 12 - 65 U/L    AST (SGOT) 13 (L) 15 - 37 U/L    Alk. phosphatase 78 50 - 136 U/L    Protein, total 7.2 6.3 - 8.2 g/dL    Albumin 3.3 3.2 - 4.6 g/dL    Globulin 3.9 (H) 2.3 - 3.5 g/dL    A-G Ratio 0.8 (L) 1.2 - 3.5     TROPONIN I    Collection Time: 11/30/17  9:40 AM   Result Value Ref Range    Troponin-I, Qt. <0.02 (L) 0.02 - 0.05 NG/ML         Assessment / Plan     Principal Problem:    Chest pain (11/30/2017)--with multiple risk factors, and abnormal troponin on 11/27--admit to telemetry, add IV heparin, hydrate and plan for [facility] possible PCI later today. Active Problems:    Type A WPW syndrome ()--noted. Not on medication for. No tachypalpitations. May need home monitor. If cath negative. Hypertension ()--continue home meds. No BB due to bradycardia. Tobacco use disorder ()--cessation strongly recommended.        History of TIA (transient ischemic attack) (11/30/2017)      Dyslipidemia (11/30/2017)--continue statin, level in am.         Zay Colindres NP     ATTENDING ADDENDUM:    Patient seen and examined by me. Agree with above note by physician extender. Key findings are:  No CP or VIEIRA at present, but a month of recurrent moderate severity heavy SSCP radiating to left chest, lasting several minutes at a time, with fatigue. Sporadic symptoms, both at rest and with exertion, but trop 2 days ago mildly elevated, back to normal now. ECG unremarkable. Asymptomatic at present. No prior cardiac denisse but on plavix for prior TIA. CV- RRR without murmur  Lungs- Clear bilaterally  Abd- soft, nontender, nondistended  Ext- no edema    Plan: As above. Admit, hydrate, LHC with poss PCI today. The benefits and risks of left heart catheterization and possible percutaneous intervention were discussed with the patient. Risks including but not limited to bleeding, infection, contrast allergy reaction, acute kidney injury, MI, stroke, emergent CABG and death were discussed. The patient understands the risks of the procedure and wishes to proceed.        Zari Deluna MD  [facility] Cardiology  Pager [phone]

## 2017-11-30 NOTE — PROCEDURES
Brief Cardiac Procedure Note    Patient: Margareth Sidhu MRN: [id number]  SSN: [id number]    YOB: 1945  Age: 74 y.o. Sex: female      Date of Procedure: 11/30/2017     Pre-procedure Diagnosis: Chest pain CCS Class III    Post-procedure Diagnosis: Non-cardiac Chest Pain    Procedure: Left Heart Catheterization    Brief Description of Procedure: lhc via right radial, tr    Performed By: Enrike Sanchez MD     Assistants: none    Anesthesia: Moderate Sedation    Estimated Blood Loss: Less than 10 mL      Specimens: None    Implants: None    Findings: normal cors and ef, mild ao dilation    Complications: None    Recommendations: Continue medical therapy.     Signed By: Enrike Sanchez MD     November 30, 2017

## 2017-11-30 NOTE — ED TRIAGE NOTES
Pt arrive c/o left side chest pain x2 months that is intermittent. Denies n/v/d. Pt has an apt with cards on 12/26 for first time.

## 2017-12-01 NOTE — PROCEDURES
[address]       Name:  Piter Chao   MR#:  [id number]   :  1945   Account #:  [de-identified]   Date of Adm:  2017       DATE OF PROCEDURE: 2017     CLINICAL INFORMATION: The patient with low positive troponin and   chest pain consistent with [place]er Class 4 angina versus non-ST   elevation myocardial infarction. Referred for catheterization. PROCEDURE DETAILS: After informed consent was obtained, a 6-  [place] sheath was placed in the right radial artery. A radial   cocktail was given by protocol. A 5-S[place] Tiger catheter and   angled pigtail were used for diagnostic angiography. TR band was   placed at the end of the procedure. FINDINGS: The left main coronary is a large vessel in a normal   anatomic position, free of significant disease. It bifurcates   into LAD and circumflex system. The LAD and diagonal branches are free of any significant   atherosclerotic narrowing. These vessels are quite tortuous. There is a moderate size proximal diagonal branch free of any   significant atherosclerotic narrowing. The circumflex is retroflexed and tortuous vessel terminating in   a distally bifurcating obtuse marginal branch. There is no   atherosclerosis seen in this vessel. The small ramus intermedius is free of any high-grade narrowing. The right coronary artery is a dominant vessel in the normal   anatomic position. There is no atherosclerosis seen in the RCA. Large bifurcating PDA or small posterolateral obtuse marginal   branch. The ongoing RCA is free of significant disease as well. Left ventriculogram reveals low normal LV systolic function,   ejection fraction is 50%. Left ventricular end-diastolic   pressure is 12 mmHg. Opening aortic pressure is 117/67 with no   gradient across the aortic valve. The aortic root is mildly   dilated.     SEDATION: Conscious sedation was given by Jesus Uriostegui under my   supervision beginning at 1400 and concluding at 0. A total of   2 mg of Versed and 50 mcg of fentanyl were given. Vital signs   and blood pressure were stable. CONCLUSIONS   1. Normal left ventricular systolic function. 2. Normal coronary angiography. 3. Mildly dilated aortic root. 4. Jay-Parkinson-White on the electrocardiogram.     RECOMMENDATIONS: Consideration for outpatient CT scan to better   quantify her aortic root dilatation. This will not be done this   hospitalization with the iv contrast from the cardiac catheterization.         Aldair Florez MD      [facility] Pearlean Dears   D:  11/30/2017   14:51   T:  11/30/2017   20:45   Job #:  [id number]

## 2018-06-12 ENCOUNTER — PATIENT OUTREACH (OUTPATIENT)
Dept: CASE MANAGEMENT | Age: 73
End: 2018-06-12

## 2018-06-12 NOTE — PROGRESS NOTES
Medication Adherence   Name:Valerie Lou [id number]   Date/Time of Call:  6/12/2018  1:20 pm    Name of medication and dosage:    Does the patient know the purpose and dosage  of medication? * Lisinopril /HCTZ 10/12.5 mg 1 every day   * Pravastatin 40 mg 1 every day     * Yes            Are you still taking this medication? If not why? * Yes    What pharmacy are you using to fill your medication and do you know the last time that you got your medication filled? * [facility]   * Last refill 2/27/2018   Are you getting a #30 day or #90 day supply of your medication? * 90 day supply    Transportation issues or any problems paying for the medication or other. * No problems with transportation. Patient uses [facility] home delivery service. Are you having any side effects from taking your medication? * No          Any other questions or concerns expressed by the patient. * No    Comments:  * Discussed  medication adherence with Mrs. Vernell Bahena and she states that there are no current barriers to prevent her from obtaining or taking her medication. Discussed last refill date of 2/27/2018 of #90 tablets with patient and it would be time for a refill on medication. Patient states she will call [facility] to obtain a refill on her medication.

## 2018-07-05 ENCOUNTER — HOSPITAL ENCOUNTER (OUTPATIENT)
Dept: GENERAL RADIOLOGY | Age: 73
Discharge: HOME | End: 2018-07-05
Attending: NURSE PRACTITIONER
Payer: MEDICARE

## 2018-07-05 ENCOUNTER — HOSPITAL ENCOUNTER (OUTPATIENT)
Dept: ULTRASOUND IMAGING | Age: 73
Discharge: HOME | End: 2018-07-05
Attending: NURSE PRACTITIONER
Payer: MEDICARE

## 2018-07-05 DIAGNOSIS — M25.561 ACUTE PAIN OF RIGHT KNEE: ICD-10-CM

## 2018-07-05 PROCEDURE — 73564 X-RAY EXAM KNEE 4 OR MORE: CPT

## 2018-07-05 PROCEDURE — 93971 EXTREMITY STUDY: CPT

## 2018-07-05 NOTE — PROGRESS NOTES
No acute injury. No major degeneration or arthritis. Do what we discussed int he office. If not improving, let us know.

## 2018-09-06 ENCOUNTER — APPOINTMENT (OUTPATIENT)
Dept: GENERAL RADIOLOGY | Age: 73
End: 2018-09-06
Attending: EMERGENCY MEDICINE
Payer: MEDICARE

## 2018-09-06 ENCOUNTER — HOSPITAL ENCOUNTER (EMERGENCY)
Age: 73
Discharge: HOME | End: 2018-09-07
Attending: EMERGENCY MEDICINE
Payer: MEDICARE

## 2018-09-06 DIAGNOSIS — R07.9 CHEST PAIN, UNSPECIFIED TYPE: Primary | ICD-10-CM

## 2018-09-06 LAB
ALBUMIN SERPL-MCNC: 3.1 G/DL (ref 3.2–4.6)
ALBUMIN/GLOB SERPL: 0.7 {RATIO} (ref 1.2–3.5)
ALP SERPL-CCNC: 78 U/L (ref 50–136)
ALT SERPL-CCNC: 15 U/L (ref 12–65)
ANION GAP SERPL CALC-SCNC: 8 MMOL/L (ref 7–16)
AST SERPL-CCNC: 17 U/L (ref 15–37)
BASOPHILS # BLD: 0.1 K/UL (ref 0–0.2)
BASOPHILS NFR BLD: 1 % (ref 0–2)
BILIRUB SERPL-MCNC: 0.5 MG/DL (ref 0.2–1.1)
BUN SERPL-MCNC: 17 MG/DL (ref 8–23)
CALCIUM SERPL-MCNC: 8.9 MG/DL (ref 8.3–10.4)
CHLORIDE SERPL-SCNC: 108 MMOL/L (ref 98–107)
CO2 SERPL-SCNC: 27 MMOL/L (ref 21–32)
CREAT SERPL-MCNC: 0.93 MG/DL (ref 0.6–1)
DIFFERENTIAL METHOD BLD: ABNORMAL
EOSINOPHIL # BLD: 0.2 K/UL (ref 0–0.8)
EOSINOPHIL NFR BLD: 3 % (ref 0.5–7.8)
ERYTHROCYTE [DISTWIDTH] IN BLOOD BY AUTOMATED COUNT: 13.7 %
GLOBULIN SER CALC-MCNC: 4.2 G/DL (ref 2.3–3.5)
GLUCOSE SERPL-MCNC: 102 MG/DL (ref 65–100)
HCT VFR BLD AUTO: 46.2 % (ref 35.8–46.3)
HGB BLD-MCNC: 15.6 G/DL (ref 11.7–15.4)
IMM GRANULOCYTES # BLD: 0 K/UL (ref 0–0.5)
IMM GRANULOCYTES NFR BLD AUTO: 0 % (ref 0–5)
LYMPHOCYTES # BLD: 2.7 K/UL (ref 0.5–4.6)
LYMPHOCYTES NFR BLD: 40 % (ref 13–44)
MCH RBC QN AUTO: 32.5 PG (ref 26.1–32.9)
MCHC RBC AUTO-ENTMCNC: 33.8 G/DL (ref 31.4–35)
MCV RBC AUTO: 96.3 FL (ref 79.6–97.8)
MONOCYTES # BLD: 0.5 K/UL (ref 0.1–1.3)
MONOCYTES NFR BLD: 7 % (ref 4–12)
NEUTS SEG # BLD: 3.3 K/UL (ref 1.7–8.2)
NEUTS SEG NFR BLD: 50 % (ref 43–78)
NRBC # BLD: 0 K/UL (ref 0–0.2)
PLATELET # BLD AUTO: 255 K/UL (ref 150–450)
PMV BLD AUTO: 10.4 FL (ref 9.4–12.3)
POTASSIUM SERPL-SCNC: 4.3 MMOL/L (ref 3.5–5.1)
PROT SERPL-MCNC: 7.3 G/DL (ref 6.3–8.2)
RBC # BLD AUTO: 4.8 M/UL (ref 4.05–5.2)
SODIUM SERPL-SCNC: 143 MMOL/L (ref 136–145)
TROPONIN I SERPL-MCNC: <0.02 NG/ML (ref 0.02–0.05)
WBC # BLD AUTO: 6.7 K/UL (ref 4.3–11.1)

## 2018-09-06 PROCEDURE — 71046 X-RAY EXAM CHEST 2 VIEWS: CPT

## 2018-09-06 PROCEDURE — 80053 COMPREHEN METABOLIC PANEL: CPT

## 2018-09-06 PROCEDURE — 85025 COMPLETE CBC W/AUTO DIFF WBC: CPT

## 2018-09-06 PROCEDURE — 84484 ASSAY OF TROPONIN QUANT: CPT

## 2018-09-06 PROCEDURE — 93005 ELECTROCARDIOGRAM TRACING: CPT | Performed by: EMERGENCY MEDICINE

## 2018-09-06 PROCEDURE — 99284 EMERGENCY DEPT VISIT MOD MDM: CPT | Performed by: EMERGENCY MEDICINE

## 2018-09-06 NOTE — ED TRIAGE NOTES
PT arrived to ED c/o chest pain that started when she was laying down. PT states pain radiates up to her shoulder. PT states taking deep breaths makes pain worse.

## 2018-09-07 VITALS
OXYGEN SATURATION: 95 % | HEIGHT: 66 IN | BODY MASS INDEX: 30.22 KG/M2 | TEMPERATURE: 98 F | WEIGHT: 188 LBS | HEART RATE: 49 BPM | DIASTOLIC BLOOD PRESSURE: 81 MMHG | SYSTOLIC BLOOD PRESSURE: 126 MMHG | RESPIRATION RATE: 16 BRPM

## 2018-09-07 LAB
ATRIAL RATE: 64 BPM
CALCULATED P AXIS, ECG09: 65 DEGREES
CALCULATED R AXIS, ECG10: 41 DEGREES
CALCULATED T AXIS, ECG11: -147 DEGREES
DIAGNOSIS, 93000: NORMAL
P-R INTERVAL, ECG05: 160 MS
Q-T INTERVAL, ECG07: 400 MS
QRS DURATION, ECG06: 82 MS
QTC CALCULATION (BEZET), ECG08: 412 MS
TROPONIN I BLD-MCNC: 0.02 NG/ML (ref 0.02–0.05)
VENTRICULAR RATE, ECG03: 64 BPM

## 2018-09-07 PROCEDURE — 84484 ASSAY OF TROPONIN QUANT: CPT

## 2018-09-07 NOTE — ED NOTES
I have reviewed discharge instructions with the patient. The patient verbalized understanding. Patient left ED via Discharge Method: ambulatory to Home with brother. Opportunity for questions and clarification provided. Patient given 0 scripts. To continue your aftercare when you leave the hospital, you may receive an automated call from our care team to check in on how you are doing. This is a free service and part of our promise to provide the best care and service to meet your aftercare needs.  If you have questions, or wish to unsubscribe from this service please call [phone]. Thank you for Choosing our [facility] Emergency Department.

## 2018-09-07 NOTE — ED PROVIDER NOTES
HPI Comments: Patient is a 77-year-old female who is coming in after she had some pain in the left side of her chest.  She states it started about 6:30 PM and resolved prior to arrival.  She states it did go into the left shoulder and was intermittent. She has not had any pain since arriving to the ED. She denies it ever being pleuritic in nature she states she's had no shortness of breath. (I did repeat nursing note.) She currently has no complaint. She also Denies any history of heart disease in the past. 
 
Patient is a 74 y.o. female presenting with chest pain. The history is provided by the patient. Chest Pain (Angina) Pertinent negatives include no abdominal pain, no cough, no fever, no nausea, no palpitations, no shortness of breath and no vomiting. Past Medical History:  
Diagnosis Date  Anxiety state, unspecified  Breast cancer ([address]  Chronic obstructive pulmonary disease ([address]  CTS (carpal tunnel syndrome) 6/25/2015  Diastolic dysfunction  Diverticulosis  Helicobacter pylori (H. pylori) 07/27/2012  Hypertension  Mixed hyperlipidemia  Myalgia and myositis, unspecified  Obesity  Palpitations  Pulmonary hypertension due to COPD ([address]  Rectal polyp  Stroke ([address] x2  
 TIA (transient ischemic attack)  Tobacco use disorder  Type A WPW syndrome Past Surgical History:  
Procedure Laterality Date [address] Vaginal for fibroid tumors  HX MASTECTOMY Bilateral 1995 Breast cancer  HX OTHER SURGICAL Thyroid Family History:  
Problem Relation Age of Onset  Cancer Father  Breast Cancer Maternal Aunt Social History Social History  Marital status:  Spouse name: N/A  
 Number of children: N/A  
 Years of education: N/A Occupational History  Not on file. Social History Main Topics  Smoking status: Current Some Day Smoker Packs/day: 0.50 Years: 20.00  Smokeless tobacco: Never Used  Alcohol use Yes Comment: Soc  Drug use: No  
 Sexual activity: Not on file Other Topics Concern  Not on file Social History Narrative ALLERGIES: Aspirin; Bee sting [sting, bee]; and Naprosyn [naproxen] Review of Systems Constitutional: Negative for chills and fever. Respiratory: Positive for chest tightness. Negative for cough, shortness of breath, wheezing and stridor. Cardiovascular: Positive for chest pain. Negative for palpitations. Gastrointestinal: Negative for abdominal pain, diarrhea, nausea and vomiting. Skin: Negative. All other systems reviewed and are negative. Vitals:  
 09/06/18 1957 BP: 132/85 Pulse: 72 Resp: 18 Temp: 98.7 °F (37.1 °C) SpO2: 96% Weight: 85.3 kg (188 lb) Height: 5' 6\" (1.676 m) Physical Exam  
Constitutional: She is oriented to person, place, and time. She appears well-developed and well-nourished. No distress. HENT:  
Head: Normocephalic and atraumatic. Eyes: Conjunctivae are normal. No scleral icterus. Neck: Normal range of motion. Neck supple. Cardiovascular: Normal rate, regular rhythm and normal heart sounds. Pulmonary/Chest: Effort normal and breath sounds normal. No stridor. No respiratory distress. She has no wheezes. She has no rales. She exhibits no tenderness. Abdominal: Soft. Bowel sounds are normal. She exhibits no distension. There is no tenderness. There is no rebound and no guarding. Neurological: She is alert and oriented to person, place, and time. No focal weakness Skin: Skin is warm and dry. No rash noted. She is not diaphoretic. No erythema. Psychiatric: She has a normal mood and affect. Her behavior is normal.  
Nursing note and vitals reviewed. MDM Number of Diagnoses or Management Options Chest pain, unspecified type:  
Diagnosis management comments: No further symptoms.   Patient in no distress. She did not want to have a second troponin drawn was able to convince her to stay for this. It is 0 I am referring her to [facility] cardiology. Nicole Do MD; 9/7/2018 @12:58 AM Voice dictation software was used during the making of this note. This software is not perfect and grammatical and other typographical errors may be present. This note has not been proofread for errors. 
=================================================================== Amount and/or Complexity of Data Reviewed Clinical lab tests: reviewed and ordered Tests in the radiology section of CPT®: ordered and reviewed (Xr Chest Pa Lat Result Date: 9/6/2018 TWO-VIEW CHEST: CLINICAL HISTORY: Left chest pain radiating to the shoulder beginning tonight. COMPARISON:  November 30, 2017. FINDINGS: PA and lateral chest images demonstrate no acute pneumonic infiltrate or significant pleural fluid collection. The heart size is within normal limits with atherosclerotic calcification and tortuosity of the thoracic aorta but no evidence of congestive heart failure or pneumothorax. The bony thorax appears intact on these views. Bilateral mastectomy is again noted with surgical clips consistent with right axillary node dissection. There are overlying radiopaque support devices. IMPRESSION:  NO ACUTE CARDIOPULMONARY DISEASE IDENTIFIED. ) Independent visualization of images, tracings, or specimens: yes (Normal sinus rhythm, narrow QRS complex, no bundle branch blocks, and no ST segment elevation ) 
 
 
 
 
ED Course Procedures

## 2018-09-07 NOTE — DISCHARGE INSTRUCTIONS
Chest Pain: Care Instructions  Your Care Instructions    There are many things that can cause chest pain. Some are not serious and will get better on their own in a few days. But some kinds of chest pain need more testing and treatment. Your doctor may have recommended a follow-up visit in the next 8 to 12 hours. If you are not getting better, you may need more tests or treatment. Even though your doctor has released you, you still need to watch for any problems. The doctor carefully checked you, but sometimes problems can develop later. If you have new symptoms or if your symptoms do not get better, get medical care right away. If you have worse or different chest pain or pressure that lasts more than 5 minutes or you passed out (lost consciousness), call 911 or seek other emergency help right away. A medical visit is only one step in your treatment. Even if you feel better, you still need to do what your doctor recommends, such as going to all suggested follow-up appointments and taking medicines exactly as directed. This will help you recover and help prevent future problems. How can you care for yourself at home? · Rest until you feel better. · Take your medicine exactly as prescribed. Call your doctor if you think you are having a problem with your medicine. · Do not drive after taking a prescription pain medicine. When should you call for help? Call 911 if:    · You passed out (lost consciousness).     · You have severe difficulty breathing.     · You have symptoms of a heart attack. These may include:  ¨ Chest pain or pressure, or a strange feeling in your chest.  ¨ Sweating. ¨ Shortness of breath. ¨ Nausea or vomiting. ¨ Pain, pressure, or a strange feeling in your back, neck, jaw, or upper belly or in one or both shoulders or arms. ¨ Lightheadedness or sudden weakness. ¨ A fast or irregular heartbeat.   After you call 911, the  may tell you to chew 1 adult-strength or 2 to 4 low-dose aspirin. Wait for an ambulance. Do not try to drive yourself.    Call your doctor today if:    · You have any trouble breathing.     · Your chest pain gets worse.     · You are dizzy or lightheaded, or you feel like you may faint.     · You are not getting better as expected.     · You are having new or different chest pain. Where can you learn more? Go to http://arthur-herminia.info/. Enter A120 in the search box to learn more about \"Chest Pain: Care Instructions. \"  Current as of: November 20, 2017  Content Version: 11.7  © 9136-3190 [employer] Care instructions adapted under license by [employer] (which disclaims liability or warranty for this information). If you have questions about a medical condition or this instruction, always ask your healthcare professional. [address] any warranty or liability for your use of this information.

## 2019-07-24 ENCOUNTER — HOSPITAL ENCOUNTER (OUTPATIENT)
Dept: GENERAL RADIOLOGY | Age: 74
Discharge: HOME | End: 2019-07-24
Payer: MEDICARE

## 2019-07-24 DIAGNOSIS — M54.2 NECK PAIN: ICD-10-CM

## 2019-07-24 DIAGNOSIS — M25.512 ACUTE PAIN OF LEFT SHOULDER: ICD-10-CM

## 2019-07-24 PROCEDURE — 73030 X-RAY EXAM OF SHOULDER: CPT

## 2019-07-24 PROCEDURE — 72052 X-RAY EXAM NECK SPINE 6/>VWS: CPT

## 2019-07-26 NOTE — PROGRESS NOTES
She has findings of arthritis in her neck and shoulder. I suspect this is the cause of her pain. Take Tylenol 2 ext Tylenol 3 times a day along with the tramadol. We can refer her to shoulder and spine ortho in the office where she is going for her leg. Josseline Gamble MD

## 2021-04-09 ENCOUNTER — HOSPITAL ENCOUNTER (OUTPATIENT)
Dept: CT IMAGING | Age: 76
Discharge: HOME | End: 2021-04-09
Attending: INTERNAL MEDICINE
Payer: MEDICARE

## 2021-04-09 VITALS — HEIGHT: 67 IN | BODY MASS INDEX: 31.08 KG/M2 | WEIGHT: 198 LBS

## 2021-04-09 DIAGNOSIS — F17.200 CURRENT EVERY DAY SMOKER: ICD-10-CM

## 2021-04-09 PROCEDURE — 71271 CT THORAX LUNG CANCER SCR C-: CPT

## 2021-04-09 PROCEDURE — 71250 CT THORAX DX C-: CPT

## 2021-04-16 NOTE — PROGRESS NOTES
Has small lung nodule noted in left upper lobe. No masses noted. She will need to have 6 month follow up CT scan done. For a nodule of this size we check every 6 months for 2 years to make sure it remains stable. If it remains stable its felt to be benign. Continue to work on smoking cessation.    Courtney Jara MD

## 2021-09-09 ENCOUNTER — HOSPITAL ENCOUNTER (OUTPATIENT)
Dept: CT IMAGING | Age: 76
Discharge: HOME | End: 2021-09-09
Attending: INTERNAL MEDICINE
Payer: MEDICARE

## 2021-09-09 DIAGNOSIS — R11.0 NAUSEA: ICD-10-CM

## 2021-09-09 DIAGNOSIS — R10.10 PAIN OF UPPER ABDOMEN: ICD-10-CM

## 2021-09-09 PROCEDURE — 74011000636 HC RX REV CODE- 636: Performed by: INTERNAL MEDICINE

## 2021-09-09 PROCEDURE — 74177 CT ABD & PELVIS W/CONTRAST: CPT

## 2021-09-09 PROCEDURE — 74011000258 HC RX REV CODE- 258: Performed by: INTERNAL MEDICINE

## 2021-09-09 RX ORDER — SODIUM CHLORIDE 0.9 % (FLUSH) 0.9 %
10 SYRINGE (ML) INJECTION
Status: COMPLETED | OUTPATIENT
Start: 2021-09-09 | End: 2021-09-09

## 2021-09-09 RX ADMIN — IOPAMIDOL 100 ML: 755 INJECTION, SOLUTION INTRAVENOUS at 13:00

## 2021-09-09 RX ADMIN — SODIUM CHLORIDE 100 ML: 900 INJECTION, SOLUTION INTRAVENOUS at 13:02

## 2021-09-09 RX ADMIN — DIATRIZOATE MEGLUMINE AND DIATRIZOATE SODIUM 15 ML: 660; 100 LIQUID ORAL; RECTAL at 13:02

## 2021-09-09 RX ADMIN — Medication 10 ML: at 13:02

## 2022-03-18 PROBLEM — Z86.73 HISTORY OF TIA (TRANSIENT ISCHEMIC ATTACK): Status: ACTIVE | Noted: 2017-11-30

## 2022-03-18 PROBLEM — E78.5 DYSLIPIDEMIA: Status: ACTIVE | Noted: 2017-11-30

## 2022-03-18 PROBLEM — R07.9 CHEST PAIN: Status: ACTIVE | Noted: 2017-11-30

## 2022-03-19 PROBLEM — J33.8 MAXILLARY POLYP OF SINUS: Status: ACTIVE | Noted: 2017-04-20

## 2022-03-19 PROBLEM — M54.32 LEFT SIDED SCIATICA: Status: ACTIVE | Noted: 2017-04-20

## 2022-06-28 RX ORDER — LISINOPRIL AND HYDROCHLOROTHIAZIDE 12.5; 1 MG/1; MG/1
TABLET ORAL
Qty: 90 TABLET | Refills: 3 | Status: SHIPPED | OUTPATIENT
Start: 2022-06-28 | End: 2022-07-01 | Stop reason: SDUPTHER

## 2022-06-28 RX ORDER — CLOPIDOGREL BISULFATE 75 MG/1
TABLET ORAL
Qty: 90 TABLET | Refills: 3 | Status: SHIPPED | OUTPATIENT
Start: 2022-06-28 | End: 2022-07-01 | Stop reason: SDUPTHER

## 2022-06-28 NOTE — TELEPHONE ENCOUNTER
Pt is requesting a refill for the following:    Lisinopril-hydrochlorothiazide 10-12.5 mg tablet    Clopidogrel 75 mg tablet    Vitamin D 38432 unit caps    Pt stated that she has one Vitamin D pill left. She would like to know if she needs to continue taking this. If so, she needs a refill for that as well.     90 day supplies    [employer]

## 2022-07-01 RX ORDER — CLOPIDOGREL BISULFATE 75 MG/1
TABLET ORAL
Qty: 90 TABLET | Refills: 3 | Status: SHIPPED | OUTPATIENT
Start: 2022-07-01

## 2022-07-01 RX ORDER — LISINOPRIL AND HYDROCHLOROTHIAZIDE 12.5; 1 MG/1; MG/1
TABLET ORAL
Qty: 90 TABLET | Refills: 3 | Status: SHIPPED | OUTPATIENT
Start: 2022-07-01

## 2022-07-01 NOTE — TELEPHONE ENCOUNTER
Pt stated that she needs the refills for the following medications sent to a different pharmacy:    Lisinopril-hydrochlorothiazide 10-12.5 mg    Clopidogrel 75 mg tablet    Vitamin D 04988 unit caps    Pt needs this sent to [facility]'s new mail order pharmacy. Please call pt to let her know when it is sent.

## 2022-08-08 ENCOUNTER — OFFICE VISIT (OUTPATIENT)
Dept: INTERNAL MEDICINE CLINIC | Facility: CLINIC | Age: 77
End: 2022-08-08
Payer: MEDICARE

## 2022-08-08 VITALS
HEIGHT: 67 IN | TEMPERATURE: 97.3 F | DIASTOLIC BLOOD PRESSURE: 87 MMHG | WEIGHT: 202 LBS | HEART RATE: 77 BPM | BODY MASS INDEX: 31.71 KG/M2 | SYSTOLIC BLOOD PRESSURE: 127 MMHG | OXYGEN SATURATION: 97 % | RESPIRATION RATE: 16 BRPM

## 2022-08-08 DIAGNOSIS — M70.62 TROCHANTERIC BURSITIS OF LEFT HIP: ICD-10-CM

## 2022-08-08 DIAGNOSIS — I10 PRIMARY HYPERTENSION: Primary | ICD-10-CM

## 2022-08-08 DIAGNOSIS — J44.9 CHRONIC OBSTRUCTIVE PULMONARY DISEASE, UNSPECIFIED COPD TYPE (HCC): ICD-10-CM

## 2022-08-08 DIAGNOSIS — E78.2 MIXED HYPERLIPIDEMIA: ICD-10-CM

## 2022-08-08 DIAGNOSIS — M25.552 LEFT HIP PAIN: ICD-10-CM

## 2022-08-08 DIAGNOSIS — F17.200 TOBACCO USE DISORDER: ICD-10-CM

## 2022-08-08 LAB
ALBUMIN SERPL-MCNC: 4 G/DL (ref 3.2–4.6)
ALBUMIN/GLOB SERPL: 1.1 {RATIO} (ref 1.2–3.5)
ALP SERPL-CCNC: 76 U/L (ref 50–136)
ALT SERPL-CCNC: 13 U/L (ref 12–65)
ANION GAP SERPL CALC-SCNC: ABNORMAL MMOL/L (ref 7–16)
AST SERPL-CCNC: 13 U/L (ref 15–37)
BASOPHILS # BLD: 0 K/UL (ref 0–0.2)
BASOPHILS NFR BLD: 1 % (ref 0–2)
BILIRUB SERPL-MCNC: 0.8 MG/DL (ref 0.2–1.1)
BUN SERPL-MCNC: 16 MG/DL (ref 8–23)
CALCIUM SERPL-MCNC: 10.5 MG/DL (ref 8.3–10.4)
CHLORIDE SERPL-SCNC: 107 MMOL/L (ref 98–107)
CHOLEST SERPL-MCNC: 236 MG/DL
CO2 SERPL-SCNC: 30 MMOL/L (ref 21–32)
CREAT SERPL-MCNC: 1 MG/DL (ref 0.6–1)
CRP SERPL-MCNC: <0.3 MG/DL (ref 0–0.9)
DIFFERENTIAL METHOD BLD: ABNORMAL
EOSINOPHIL # BLD: 0 K/UL (ref 0–0.8)
EOSINOPHIL NFR BLD: 1 % (ref 0.5–7.8)
ERYTHROCYTE [DISTWIDTH] IN BLOOD BY AUTOMATED COUNT: 13.4 % (ref 11.9–14.6)
ERYTHROCYTE [SEDIMENTATION RATE] IN BLOOD: 13 MM/HR (ref 0–30)
GLOBULIN SER CALC-MCNC: 3.6 G/DL (ref 2.3–3.5)
GLUCOSE SERPL-MCNC: 89 MG/DL (ref 65–100)
HCT VFR BLD AUTO: 49.6 % (ref 35.8–46.3)
HDLC SERPL-MCNC: 59 MG/DL (ref 40–60)
HDLC SERPL: 4 {RATIO}
HGB BLD-MCNC: 15.8 G/DL (ref 11.7–15.4)
IMM GRANULOCYTES # BLD AUTO: 0 K/UL (ref 0–0.5)
IMM GRANULOCYTES NFR BLD AUTO: 0 % (ref 0–5)
LDLC SERPL CALC-MCNC: 155.4 MG/DL
LYMPHOCYTES # BLD: 1.8 K/UL (ref 0.5–4.6)
LYMPHOCYTES NFR BLD: 40 % (ref 13–44)
MCH RBC QN AUTO: 31.4 PG (ref 26.1–32.9)
MCHC RBC AUTO-ENTMCNC: 31.9 G/DL (ref 31.4–35)
MCV RBC AUTO: 98.6 FL (ref 79.6–97.8)
MONOCYTES # BLD: 0.3 K/UL (ref 0.1–1.3)
MONOCYTES NFR BLD: 7 % (ref 4–12)
NEUTS SEG # BLD: 2.3 K/UL (ref 1.7–8.2)
NEUTS SEG NFR BLD: 51 % (ref 43–78)
NRBC # BLD: 0 K/UL (ref 0–0.2)
PLATELET # BLD AUTO: 244 K/UL (ref 150–450)
PMV BLD AUTO: 10.6 FL (ref 9.4–12.3)
POTASSIUM SERPL-SCNC: 4.7 MMOL/L (ref 3.5–5.1)
PROT SERPL-MCNC: 7.6 G/DL (ref 6.3–8.2)
RBC # BLD AUTO: 5.03 M/UL (ref 4.05–5.2)
SODIUM SERPL-SCNC: 135 MMOL/L (ref 136–145)
T4 FREE SERPL-MCNC: 0.9 NG/DL (ref 0.78–1.46)
TRIGL SERPL-MCNC: 108 MG/DL (ref 35–150)
TSH, 3RD GENERATION: 0.69 UIU/ML (ref 0.36–3.74)
VLDLC SERPL CALC-MCNC: 21.6 MG/DL (ref 6–23)
WBC # BLD AUTO: 4.5 K/UL (ref 4.3–11.1)

## 2022-08-08 PROCEDURE — G8400 PT W/DXA NO RESULTS DOC: HCPCS | Performed by: INTERNAL MEDICINE

## 2022-08-08 PROCEDURE — G8427 DOCREV CUR MEDS BY ELIG CLIN: HCPCS | Performed by: INTERNAL MEDICINE

## 2022-08-08 PROCEDURE — 99214 OFFICE O/P EST MOD 30 MIN: CPT | Performed by: INTERNAL MEDICINE

## 2022-08-08 PROCEDURE — 1123F ACP DISCUSS/DSCN MKR DOCD: CPT | Performed by: INTERNAL MEDICINE

## 2022-08-08 PROCEDURE — 3023F SPIROM DOC REV: CPT | Performed by: INTERNAL MEDICINE

## 2022-08-08 PROCEDURE — G8417 CALC BMI ABV UP PARAM F/U: HCPCS | Performed by: INTERNAL MEDICINE

## 2022-08-08 PROCEDURE — 1090F PRES/ABSN URINE INCON ASSESS: CPT | Performed by: INTERNAL MEDICINE

## 2022-08-08 PROCEDURE — 4004F PT TOBACCO SCREEN RCVD TLK: CPT | Performed by: INTERNAL MEDICINE

## 2022-08-08 RX ORDER — PREDNISONE 20 MG/1
20 TABLET ORAL DAILY
Qty: 5 TABLET | Refills: 0 | Status: SHIPPED | OUTPATIENT
Start: 2022-08-08 | End: 2022-08-13

## 2022-08-08 ASSESSMENT — PATIENT HEALTH QUESTIONNAIRE - PHQ9
2. FEELING DOWN, DEPRESSED OR HOPELESS: 0
1. LITTLE INTEREST OR PLEASURE IN DOING THINGS: 0
SUM OF ALL RESPONSES TO PHQ QUESTIONS 1-9: 0
SUM OF ALL RESPONSES TO PHQ9 QUESTIONS 1 & 2: 0

## 2022-08-08 NOTE — PROGRESS NOTES
Marital status:      Spouse name: None    Number of children: None    Years of education: None    Highest education level: None   Tobacco Use    Smoking status: Some Days     Packs/day: 0.50     Types: Cigarettes    Smokeless tobacco: Never   Substance and Sexual Activity    Alcohol use: Yes    Drug use: No     Past Surgical History:   Procedure Laterality Date    HYSTERECTOMY (CERVIX STATUS UNKNOWN)  1976    Vaginal for fibroid tumors    MASTECTOMY Bilateral 1995    Breast cancer    OTHER SURGICAL HISTORY      Thyroid     Family History   Problem Relation Age of Onset    Breast Cancer Maternal Aunt     Cancer Father      Current Outpatient Medications   Medication Sig Dispense Refill    lisinopril-hydroCHLOROthiazide (PRINZIDE;ZESTORETIC) 10-12.5 MG per tablet TAKE 1 TABLET DAILY 90 tablet 3    clopidogrel (PLAVIX) 75 MG tablet TAKE 1 TABLET EVERY DAY 90 tablet 3    vitamin D (CHOLECALCIFEROL) 93924 UNIT CAPS Take 1 capsule by mouth every 7 days 12 capsule 3    acetaminophen (TYLENOL) 500 MG tablet Take 500 mg by mouth every 6 hours as needed      pravastatin (PRAVACHOL) 40 MG tablet Take 40 mg by mouth      traMADol (ULTRAM) 50 MG tablet Take 50 mg by mouth 2 times daily. No current facility-administered medications for this visit. Health Maintenance   Topic Date Due    Pneumococcal 65+ years Vaccine (1 - PCV) Never done    Hepatitis C screen  Never done    DTaP/Tdap/Td vaccine (1 - Tdap) Never done    Shingles vaccine (1 of 2) Never done    DEXA (modify frequency per FRAX score)  Never done    COVID-19 Vaccine (4 - Booster for Pfizer series) 03/29/2022    Flu vaccine (1) 09/01/2022    Annual Wellness Visit (AWV)  11/23/2022    Lipids  04/05/2023    Depression Screen  04/05/2023    Hepatitis A vaccine  Aged Out    Hepatitis B vaccine  Aged Out    Hib vaccine  Aged Out    Meningococcal (ACWY) vaccine  Aged Out             Review of Systems  Review of Systems   Constitutional:  Negative for fever. Eyes:  Negative for visual disturbance. Respiratory:  Negative for cough and shortness of breath. Cardiovascular:  Negative for chest pain. Genitourinary:  Negative for difficulty urinating. Musculoskeletal:  Positive for arthralgias and myalgias. /87 (Site: Left Upper Arm, Position: Sitting)   Pulse 77   Temp 97.3 °F (36.3 °C) (Temporal)   Resp 16   Ht 5' 7\" (1.702 m)   Wt 202 lb (91.6 kg)   SpO2 97%   BMI 31.64 kg/m²     Wt Readings from Last 3 Encounters:   08/08/22 202 lb (91.6 kg)   04/05/22 205 lb (93 kg)   11/22/21 201 lb (91.2 kg)       Physical Exam    Physical Exam  Vitals and nursing note reviewed. Constitutional:       Appearance: Normal appearance. She is obese. HENT:      Head: Normocephalic and atraumatic. Cardiovascular:      Rate and Rhythm: Normal rate and regular rhythm. Pulmonary:      Effort: Pulmonary effort is normal.      Breath sounds: Normal breath sounds. Abdominal:      General: Bowel sounds are normal.   Musculoskeletal:      Comments: No pain with rom of hip. No tenderness anteriorly   Tenderness over left bursa   Skin:     General: Skin is warm and dry. Comments: Dry patches,    Neurological:      Mental Status: She is alert. Assessment & Plan    Encounter Diagnoses   Name Primary? Primary hypertension Yes    Mixed hyperlipidemia     Left hip pain     Trochanteric bursitis of left hip     Chronic obstructive pulmonary disease, unspecified COPD type ([facility] [address]     Tobacco use disorder        Orders Placed This Encounter   Medications    predniSONE (DELTASONE) 20 MG tablet     Sig: Take 1 tablet by mouth in the morning for 5 days.      Dispense:  5 tablet     Refill:  0       Orders Placed This Encounter   Procedures    C-Reactive Protein     Standing Status:   Future     Number of Occurrences:   1     Standing Expiration Date:   8/8/2023    Comprehensive Metabolic Panel     Standing Status:   Future     Number of Occurrences:   1 Standing Expiration Date:   8/8/2023    TSH     Standing Status:   Future     Number of Occurrences:   1     Standing Expiration Date:   8/8/2023    T4, Free     Standing Status:   Future     Number of Occurrences:   1     Standing Expiration Date:   8/8/2023    Lipid Panel     Standing Status:   Future     Number of Occurrences:   1     Standing Expiration Date:   8/8/2023    CBC with Auto Differential     Standing Status:   Future     Number of Occurrences:   1     Standing Expiration Date:   8/8/2023    Sedimentation Rate     Standing Status:   Future     Number of Occurrences:   1     Standing Expiration Date:   8/8/2023     Check labs. Steroids for her bursitis  Continue the same with BP meds. HTN well controlled. Encouraged smoking cessation. Return in about 6 months (around 2/8/2023), or if symptoms worsen or fail to improve, for routine follow up of chronic medical issues.         Coretta Novoa MD

## 2022-08-12 ENCOUNTER — TELEPHONE (OUTPATIENT)
Dept: INTERNAL MEDICINE CLINIC | Facility: CLINIC | Age: 77
End: 2022-08-12

## 2022-08-12 DIAGNOSIS — E83.52 HYPERCALCEMIA: ICD-10-CM

## 2022-08-12 DIAGNOSIS — E83.52 HYPERCALCEMIA: Primary | ICD-10-CM

## 2022-08-15 LAB — 25(OH)D3 SERPL-MCNC: 82.5 NG/ML (ref 30–100)

## 2022-08-16 ENCOUNTER — TELEPHONE (OUTPATIENT)
Dept: INTERNAL MEDICINE CLINIC | Facility: CLINIC | Age: 77
End: 2022-08-16

## 2022-08-16 NOTE — TELEPHONE ENCOUNTER
----- Message from Raul Alicea MD sent at 8/16/2022  1:53 PM EDT -----  Vitamin D level is good. She can now just take Vitamin 31801 once a month to help maintain levels.   Raul Alicea MD

## 2022-08-17 ASSESSMENT — ENCOUNTER SYMPTOMS
SHORTNESS OF BREATH: 0
COUGH: 0

## 2022-08-24 DIAGNOSIS — I27.23 PULMONARY HYPERTENSION DUE TO COPD (HCC): ICD-10-CM

## 2022-08-24 DIAGNOSIS — J44.9 PULMONARY HYPERTENSION DUE TO COPD (HCC): ICD-10-CM

## 2022-08-24 DIAGNOSIS — G45.9 TIA (TRANSIENT ISCHEMIC ATTACK): ICD-10-CM

## 2022-08-24 DIAGNOSIS — Z79.899 ENCOUNTER FOR LONG-TERM (CURRENT) USE OF MEDICATIONS: ICD-10-CM

## 2022-08-24 DIAGNOSIS — E83.52 HYPERCALCEMIA: Primary | ICD-10-CM

## 2022-08-24 DIAGNOSIS — I10 PRIMARY HYPERTENSION: ICD-10-CM

## 2022-09-13 ENCOUNTER — NURSE ONLY (OUTPATIENT)
Dept: INTERNAL MEDICINE CLINIC | Facility: CLINIC | Age: 77
End: 2022-09-13

## 2022-09-13 DIAGNOSIS — G45.9 TIA (TRANSIENT ISCHEMIC ATTACK): ICD-10-CM

## 2022-09-13 DIAGNOSIS — Z79.899 ENCOUNTER FOR LONG-TERM (CURRENT) USE OF MEDICATIONS: ICD-10-CM

## 2022-09-13 DIAGNOSIS — J44.9 PULMONARY HYPERTENSION DUE TO COPD (HCC): ICD-10-CM

## 2022-09-13 DIAGNOSIS — I10 PRIMARY HYPERTENSION: ICD-10-CM

## 2022-09-13 DIAGNOSIS — E83.52 HYPERCALCEMIA: ICD-10-CM

## 2022-09-13 DIAGNOSIS — I27.23 PULMONARY HYPERTENSION DUE TO COPD (HCC): ICD-10-CM

## 2022-09-14 LAB
ANION GAP SERPL CALC-SCNC: 5 MMOL/L (ref 4–13)
BUN SERPL-MCNC: 14 MG/DL (ref 8–23)
CALCIUM SERPL-MCNC: 10.5 MG/DL (ref 8.3–10.4)
CALCIUM SERPL-MCNC: 10.8 MG/DL (ref 8.3–10.4)
CHLORIDE SERPL-SCNC: 107 MMOL/L (ref 101–110)
CO2 SERPL-SCNC: 26 MMOL/L (ref 21–32)
CREAT SERPL-MCNC: 1 MG/DL (ref 0.6–1)
GLUCOSE SERPL-MCNC: 97 MG/DL (ref 65–100)
PHOSPHATE SERPL-MCNC: 3.8 MG/DL (ref 2.3–3.7)
POTASSIUM SERPL-SCNC: 4.8 MMOL/L (ref 3.5–5.1)
PTH-INTACT SERPL-MCNC: 90.7 PG/ML (ref 18.5–88)
SODIUM SERPL-SCNC: 138 MMOL/L (ref 136–145)

## 2022-09-28 ENCOUNTER — TELEPHONE (OUTPATIENT)
Dept: INTERNAL MEDICINE CLINIC | Facility: CLINIC | Age: 77
End: 2022-09-28

## 2022-09-28 DIAGNOSIS — E21.3 HYPERPARATHYROIDISM (HCC): Primary | ICD-10-CM

## 2022-09-28 NOTE — TELEPHONE ENCOUNTER
She has overactive parathyroid. Needs ultrasound of parathyroid glands in her neck.    Viry Calvert MD .

## 2022-10-20 ENCOUNTER — HOSPITAL ENCOUNTER (OUTPATIENT)
Dept: ULTRASOUND IMAGING | Age: 77
Discharge: HOME | End: 2022-10-23
Payer: MEDICARE

## 2022-10-20 DIAGNOSIS — E21.3 HYPERPARATHYROIDISM (HCC): ICD-10-CM

## 2022-10-20 PROCEDURE — 76536 US EXAM OF HEAD AND NECK: CPT

## 2022-10-24 ENCOUNTER — TELEPHONE (OUTPATIENT)
Dept: INTERNAL MEDICINE CLINIC | Facility: CLINIC | Age: 77
End: 2022-10-24

## 2022-10-24 NOTE — TELEPHONE ENCOUNTER
Attempted to reach patient . Left message to call office. May have an enlarged parathyroid gland. Recommend referral to ENT to further evaluate and see is she needs to have surgery.    Roque Kc MD

## 2022-10-25 NOTE — TELEPHONE ENCOUNTER
I have talked with Ms. Saez and have given her this message. She is ok with going to the ENT as she is very hoarse.  She also asks for you to call her cell phone at [phone]

## 2022-10-28 ENCOUNTER — TELEPHONE (OUTPATIENT)
Dept: INTERNAL MEDICINE CLINIC | Facility: CLINIC | Age: 77
End: 2022-10-28

## 2022-10-28 DIAGNOSIS — E21.3 HYPERPARATHYROIDISM (HCC): Primary | ICD-10-CM

## 2022-10-28 DIAGNOSIS — E83.52 HYPERCALCEMIA: ICD-10-CM

## 2022-10-28 NOTE — TELEPHONE ENCOUNTER
Patient called stating she was waiting for her referral for the ENT . I don't see a referral but see a message stating one will be sent.  She is wanting that sent in so she can get an appointment

## 2022-11-15 ENCOUNTER — OFFICE VISIT (OUTPATIENT)
Dept: ENT CLINIC | Age: 77
End: 2022-11-15
Payer: MEDICARE

## 2022-11-15 VITALS — BODY MASS INDEX: 31.55 KG/M2 | WEIGHT: 201 LBS | HEIGHT: 67 IN

## 2022-11-15 DIAGNOSIS — E21.3 HYPERPARATHYROIDISM (HCC): ICD-10-CM

## 2022-11-15 DIAGNOSIS — E83.52 HYPERCALCEMIA: Primary | ICD-10-CM

## 2022-11-15 PROCEDURE — 1123F ACP DISCUSS/DSCN MKR DOCD: CPT | Performed by: OTOLARYNGOLOGY

## 2022-11-15 PROCEDURE — 99204 OFFICE O/P NEW MOD 45 MIN: CPT | Performed by: OTOLARYNGOLOGY

## 2022-11-15 NOTE — PROGRESS NOTES
OFFICE VISIT       11/15/22    Chief Complaint   Patient presents with    Other     hyperparathyroid       HPI:  Kev Brady is a 76 y.o. female seen in consultation today for   Chief Complaint   Patient presents with    Other     hyperparathyroid   . Recent thyroid US showing 1.3cm right thryoid nodule, previous left thryoidectomy, residual density left thyroid bed. Labs showing mild elevation in calcium and iPTH levels in the setting of decreased GFR. Associated symptoms within upper aerodigestive tract: hoarseness, hair loss, weight loss   Patient had bloodwork done and thyroid ultrasound. Outpatient Encounter Medications as of 11/15/2022   Medication Sig Dispense Refill    lisinopril-hydroCHLOROthiazide (PRINZIDE;ZESTORETIC) 10-12.5 MG per tablet TAKE 1 TABLET DAILY 90 tablet 3    clopidogrel (PLAVIX) 75 MG tablet TAKE 1 TABLET EVERY DAY 90 tablet 3    vitamin D (CHOLECALCIFEROL) 78194 UNIT CAPS Take 1 capsule by mouth every 7 days (Patient taking differently: Take 50,000 Units by mouth every 30 days) 12 capsule 3    acetaminophen (TYLENOL) 500 MG tablet Take 500 mg by mouth every 6 hours as needed      pravastatin (PRAVACHOL) 40 MG tablet Take 40 mg by mouth      traMADol (ULTRAM) 50 MG tablet Take 50 mg by mouth as needed. No facility-administered encounter medications on file as of 11/15/2022.        Past Medical History:   Diagnosis Date    Anxiety state, unspecified     Breast cancer ([address]     Chronic obstructive pulmonary disease ([address]     CTS (carpal tunnel syndrome) 6/26/5540    Diastolic dysfunction     Diverticulosis     Helicobacter pylori (H. pylori) 07/27/2012    Hypertension     Mixed hyperlipidemia     Myalgia and myositis, unspecified     Obesity     Palpitations     Pulmonary hypertension due to COPD ([address]     Rectal polyp     Stroke ([address]     x2    TIA (transient ischemic attack)     Tobacco use disorder     Type A WPW syndrome        Past Surgical History:   Procedure Laterality Date    HYSTERECTOMY (CERVIX STATUS UNKNOWN)  1976    Vaginal for fibroid tumors    MASTECTOMY Bilateral 1995    Breast cancer    OTHER SURGICAL HISTORY      Thyroid       Family History   Problem Relation Age of Onset    Breast Cancer Maternal Aunt     Cancer Father        Social History     Socioeconomic History    Marital status:      Spouse name: None    Number of children: None    Years of education: None    Highest education level: None   Tobacco Use    Smoking status: Some Days     Packs/day: 0.50     Types: Cigarettes    Smokeless tobacco: Never   Substance and Sexual Activity    Alcohol use: Yes    Drug use: No       Allergies   Allergen Reactions    Naproxen Other (See Comments)     Abdominal pain    Aspirin Nausea And Vomiting       ROS:  The patient was asked specifically about the following. General: Fever, chills, night sweats, unexplained weight loss or weight gain  Eyes: Blurry vision, double vision, floaters, loss or decrease of peripheral vision.    Ears: Difficulty hearing, ringing or buzzing in the ears, ear fullness, frequent ear infections, ear pain or drainage, hearing aid  Nose/Face: Drainage, frequent nosebleeds, sinus pain, pressure or fullness, difficulty breathing through the nose, facial pain, swelling or masses  Mouth: Sores in mouth, tongue soreness, bleeding gums, wears dentures, growths in mouth  Throat: Sore throat, hoarseness, difficulty swallowing, lump in throat, sore throat frequency  Respiratory: Difficulty breathing, frequent cough, productive cough, wheezing, recent abnormal chest X-RAY  Cardiovascular: Blocked arteries, chest pain, shortness of breath, abnormal heart beat, pacemaker  Digestive: Frequent indigestion, burning in throat,chest or stomach after a meal, burning that wakes you in the night, abdominal pain  Neuropsychiatric: Headaches, seizures, facial numbness or tingling, weakness, depressed mood or feeling sad, anxiety, inability to cope  Hematologic/Lymphatic: Anemia, easy bruising or bleeding, swollen glands, transfusions  Allergy/Immunologic: Hay fever, environmental allergies, food allergies, allergy testing, immunodeficiency  Endocrine: Heat or cold intolerance, fatigue, heart racing, profuse sweating, thyroid swelling, over or underactive thyroid, pituitary problems  Other: other problems not mentioned  All systems were negative with the exception of the following pertinent positives:  Weight gain   Hair loss  Hoarseness  Right throat pain      Vitals:   Vitals:    11/15/22 1025   Weight: 201 lb (91.2 kg)   Height: 5' 7\" (1.702 m)       PHYSICAL EXAM: A comprehensive physical exam was performed in the following manner. Unless otherwise indicated in pertinent findings section below, findings were within normal limits. APPEARANCE:   General assessment for development status, nutritional status, and for pain or distress was performed. COMMUNICATION:   Ability to communicate effectively including vocal quality was assessed. HEAD AND FACE:   General exam of the face and scalp for any gross masses or lesions was performed. Palpation of the sinuses for any sign of pain or tenderness was performed. Facial nerve examination for any facial mimetic muscle asymmetry at rest and with effort was performed. Palpation of the submandibular and parotid glands was performed to assess for asymmetry, nodule or masses. EYES:   Extraocular motility was assessed for medial, lateral, superior and inferior rectus function as well as inferior and superior oblique function. The conjunctiva and eyelids were examined for injection, pallor or swelling. Pupil reactivity and accomodation was assessed. EARS:   External inspection and palpation of the auricular skin and cartilage was performed for lesion or abnormality.    Otoscopy of the external auditory and tympanic membranes was performed to assess for patency, induration, erythema, tympanic membrane health and mobility and the presence of any middle ear fluid or abnormality. Speech reception thresholds were grossly assessed through communication at normal conversational levels. NOSE:   External exam for gross deformity of the nasal bones and upper and lower lateral cartilages was performed. Anterior rhinoscopy was performed to assess the patency of the nasal airway, the anatomy of the nasal septum and turbinates as well as the nasal valve region, and the general mucosal health. The presence of any rhinorrhea and its consistency was noted. Any abnormalities requiring further evaluation by nasal endoscopy will be described below. MOUTH/PHARYNX/LARYNX:   Assessment of the lips, gums, hard/soft palate, tongue, tonsillar fossae and oropharynx for mass, lesions or mucosal abnormalities was performed. The base of tongue and floor of mouth were inspected for lesions and palpated for mass or nodularity. Mirror exam of the larynx to assess for vocal fold mobility and any gross mass or lesion was performed. Mirror exam of the nasopharynx was attempted, to assess for gross mass or lesion of the nasopharynx or any of adenoidal hyperplasia or inflammation. Any abnormalities requiring further examination by flexible endoscopy will be described below. NECK:   Gross inspection of the neck was performed to assess for mass or asymmetry. Palpation of the level I-IV lymph nodes was performed to assess for any grossly enlarged, or abnormally firm lymphadenopathy. The skin of the neck was examined for any induration or swelling and palpated for any crepitus. The larynx and trachea were palpated to assess position in the neck and continuity. The thyroid was palpated to assess for any mass, nodularity or asymmetry. NEURO/PSYCH:   Cranial nerves II-XII were grossly assessed for any weakness or asymmetry.    If indicated, CN I was assessed by administration of a standardized smell test (UPSIT). Orientation to person, place and time was assessed. Mood and affect were assessed. RESPIRATION:   Respiratory effort was assessed for increased work of breathing and inspiratory or expiratory wheezing. Chest expansion was noted for symmetry. CARDIOVASCULAR:   Gross examination for peripheral vascular edema and jugular venous distension was performed. PERTINENT PHYSICAL EXAM FINDINGS - examination for above was grossly within normal limits with exceptions listed below:  Tenderness to palpation on right hyoid facet, consistent with hyoid pain syndrome. No palpable abnormality of the bilateral thryoid beds. No associated cervical lymphadenopathy. Studies Reviewed  Referral documentation        ASSESSMENT AND PLAN:    [unfilled]      ICD-10-CM    1. Hypercalcemia  E83.52 NM PARATHYROID SCAN      2. Hyperparathyroidism ([facility] [address]  E21.3 NM PARATHYROID SCAN         My impression is that this is most likely a tertiary hypercalcemia, however sestamibi would allow for localization of hyperactive parathyroid gland given question on ultrasound. Will call with results of Sestamibi. Discussed warm compress and ibuprofen for hyoid pain. Repeat US in one year to follow 1.3cm right thyroid nodule. The patient diagnoses and management plan were discussed at length. They  demonstrated an understanding of the plan and stated that all questions were answered to their satisfaction.        PATIENT EDUCATION / INSTRUCTIONS GIVEN FOR:  Hypercalcemia, Hyper PTH  Sestamibi scan

## 2022-11-30 ENCOUNTER — TELEPHONE (OUTPATIENT)
Dept: ENT CLINIC | Age: 77
End: 2022-11-30

## 2022-11-30 DIAGNOSIS — E21.3 HYPERPARATHYROIDISM (HCC): ICD-10-CM

## 2022-11-30 DIAGNOSIS — E83.52 HYPERCALCEMIA: Primary | ICD-10-CM

## 2022-12-01 ENCOUNTER — TELEPHONE (OUTPATIENT)
Dept: INTERNAL MEDICINE CLINIC | Facility: CLINIC | Age: 77
End: 2022-12-01

## 2023-01-04 RX ORDER — PRAVASTATIN SODIUM 40 MG
TABLET ORAL
Qty: 90 TABLET | Refills: 3 | Status: SHIPPED | OUTPATIENT
Start: 2023-01-04

## 2023-01-04 RX ORDER — BACLOFEN 10 MG/1
TABLET ORAL
COMMUNITY
Start: 2022-10-13

## 2023-01-09 ENCOUNTER — TELEPHONE (OUTPATIENT)
Dept: INTERNAL MEDICINE CLINIC | Facility: CLINIC | Age: 78
End: 2023-01-09

## 2023-01-09 DIAGNOSIS — J33.8 MAXILLARY POLYP OF SINUS: Primary | ICD-10-CM

## 2023-01-09 NOTE — TELEPHONE ENCOUNTER
----- Message from Chente Franklin sent at 1/9/2023  8:53 AM EST -----  Subject: Referral Request    Reason for referral request? ENT   Provider patient wants to be referred to(if known):     Provider Phone Number(if known): Additional Information for Provider? Patient wants to be referred to   another  than the [facility] ENT.  Patient wants this to be in her network   as well.   ---------------------------------------------------------------------------  --------------  [id number] [employer]    [phone]; OK to leave message on voicemail  ---------------------------------------------------------------------------  --------------

## 2023-01-20 ENCOUNTER — TELEMEDICINE (OUTPATIENT)
Dept: INTERNAL MEDICINE CLINIC | Facility: CLINIC | Age: 78
End: 2023-01-20
Payer: MEDICARE

## 2023-01-20 DIAGNOSIS — F51.01 PRIMARY INSOMNIA: ICD-10-CM

## 2023-01-20 DIAGNOSIS — R35.0 URINARY FREQUENCY: ICD-10-CM

## 2023-01-20 DIAGNOSIS — J01.00 ACUTE MAXILLARY SINUSITIS, RECURRENCE NOT SPECIFIED: Primary | ICD-10-CM

## 2023-01-20 PROCEDURE — 99442 PR PHYS/QHP TELEPHONE EVALUATION 11-20 MIN: CPT | Performed by: NURSE PRACTITIONER

## 2023-01-20 RX ORDER — AMOXICILLIN AND CLAVULANATE POTASSIUM 875; 125 MG/1; MG/1
1 TABLET, FILM COATED ORAL 2 TIMES DAILY
Qty: 14 TABLET | Refills: 0 | Status: SHIPPED | OUTPATIENT
Start: 2023-01-20 | End: 2023-01-27

## 2023-01-20 RX ORDER — METHYLPREDNISOLONE 4 MG/1
TABLET ORAL
Qty: 1 KIT | Refills: 0 | Status: SHIPPED | OUTPATIENT
Start: 2023-01-20 | End: 2023-01-26

## 2023-01-20 RX ORDER — HYDROXYZINE HYDROCHLORIDE 25 MG/1
25 TABLET, FILM COATED ORAL NIGHTLY
Qty: 30 TABLET | Refills: 0 | Status: SHIPPED | OUTPATIENT
Start: 2023-01-20 | End: 2023-02-19

## 2023-01-20 RX ORDER — GUAIFENESIN 600 MG/1
600 TABLET, EXTENDED RELEASE ORAL 2 TIMES DAILY
Qty: 30 TABLET | Refills: 0 | Status: SHIPPED | OUTPATIENT
Start: 2023-01-20 | End: 2023-02-04

## 2023-01-20 ASSESSMENT — ENCOUNTER SYMPTOMS
COUGH: 1
SHORTNESS OF BREATH: 0
DIARRHEA: 0
SORE THROAT: 1
SINUS PAIN: 1
CHEST TIGHTNESS: 0
NAUSEA: 0
CONSTIPATION: 0
VOMITING: 0
WHEEZING: 0
RHINORRHEA: 1
ABDOMINAL PAIN: 0
SINUS PRESSURE: 1

## 2023-01-20 ASSESSMENT — PATIENT HEALTH QUESTIONNAIRE - PHQ9
SUM OF ALL RESPONSES TO PHQ QUESTIONS 1-9: 0
1. LITTLE INTEREST OR PLEASURE IN DOING THINGS: 0
2. FEELING DOWN, DEPRESSED OR HOPELESS: 0
SUM OF ALL RESPONSES TO PHQ QUESTIONS 1-9: 0
SUM OF ALL RESPONSES TO PHQ9 QUESTIONS 1 & 2: 0

## 2023-01-20 NOTE — PROGRESS NOTES
1/20/2023 12:34 PM  Location:[facility] [address] [facility]  [place]  Patient #:  [id number]  YOB: 1945        History of Present Illness     Chief Complaint   Patient presents with    Congestion     Congestion, cough, sore and red throat, stomach hurts, shortness of breath  Took Covid test yesterday, negative thinks she has a uti   Thyroid issue        Ms. Di Hernadez is a [de-identified] y.o. female  who presents for vv on phone, dx with hyperparathyroid, had seen ent at [facility], unhappy with care, pcp sent referral to dr Keenan Maher in [place], she has not heard back from a referral that was sent. It was only just sent on 1/18. She is having sore throat, mucous, coughing up mucous that is clear. No fever/chills, covid tests at home are neg. No body aches. She is not sleeping well, states her stomach feels like butterflies are in it and is nervous at night and cannot sleep well. No n/v/d. Bms are regular. Congestion in sinuses x 1 week. Hx of copd, has to sit up at times to breathe at night. No sob during the day and no wheezing. She states her throat hurts on the R side and is swollen. She is also having urinary frequency and getting up  a lot at night to void .          Allergies   Allergen Reactions    Naproxen Other (See Comments)     Abdominal pain    Aspirin Nausea And Vomiting     Past Medical History:   Diagnosis Date    Anxiety state, unspecified     Breast cancer ([address]     Chronic obstructive pulmonary disease (HCC)     CTS (carpal tunnel syndrome) 3/58/3702    Diastolic dysfunction     Diverticulosis     Helicobacter pylori (H. pylori) 07/27/2012    Hypertension     Mixed hyperlipidemia     Myalgia and myositis, unspecified     Obesity     Palpitations     Pulmonary hypertension due to COPD ([address]     Rectal polyp     Stroke ([address]     x2    TIA (transient ischemic attack)     Tobacco use disorder     Type A WPW syndrome      Social History     Socioeconomic History    Marital status:      Spouse name: None    Number of children: None    Years of education: None    Highest education level: None   Tobacco Use    Smoking status: Some Days     Packs/day: 0.50     Types: Cigarettes    Smokeless tobacco: Never   Substance and Sexual Activity    Alcohol use: Yes    Drug use: No     Past Surgical History:   Procedure Laterality Date    HYSTERECTOMY (CERVIX STATUS UNKNOWN)  1976    Vaginal for fibroid tumors    MASTECTOMY Bilateral 1995    Breast cancer    OTHER SURGICAL HISTORY      Thyroid     Current Outpatient Medications   Medication Sig Dispense Refill    amoxicillin-clavulanate (AUGMENTIN) 875-125 MG per tablet Take 1 tablet by mouth 2 times daily for 7 days 14 tablet 0    methylPREDNISolone (MEDROL DOSEPACK) 4 MG tablet Take by mouth. 1 kit 0    hydrOXYzine HCl (ATARAX) 25 MG tablet Take 1 tablet by mouth nightly 30 tablet 0    guaiFENesin (MUCINEX) 600 MG extended release tablet Take 1 tablet by mouth 2 times daily for 15 days 30 tablet 0    pravastatin (PRAVACHOL) 40 MG tablet TAKE 1 TABLET EVERY NIGHT 90 tablet 3    baclofen (LIORESAL) 10 MG tablet TAKE 1 TABLET BY MOUTH THREE TIMES A DAY AS NEEDED FOR MUSCLE SPASMS      lisinopril-hydroCHLOROthiazide (PRINZIDE;ZESTORETIC) 10-12.5 MG per tablet TAKE 1 TABLET DAILY 90 tablet 3    clopidogrel (PLAVIX) 75 MG tablet TAKE 1 TABLET EVERY DAY 90 tablet 3    vitamin D (CHOLECALCIFEROL) 85249 UNIT CAPS Take 1 capsule by mouth every 7 days (Patient taking differently: Take 50,000 Units by mouth every 30 days) 12 capsule 3    acetaminophen (TYLENOL) 500 MG tablet Take 500 mg by mouth every 6 hours as needed      traMADol (ULTRAM) 50 MG tablet Take 50 mg by mouth as needed. No current facility-administered medications for this visit.      Health Maintenance   Topic Date Due    Pneumococcal 65+ years Vaccine (1 - PCV) Never done    DTaP/Tdap/Td vaccine (1 - Tdap) Never done    Shingles vaccine (1 of 2) Never done DEXA (modify frequency per FRAX score)  Never done    Flu vaccine (1) Never done    COVID-19 Vaccine (5 - Booster for Pfizer series) 09/06/2022    Annual Wellness Visit (AWV)  11/23/2022    Lipids  08/08/2023    Depression Screen  01/20/2024    Hepatitis A vaccine  Aged Out    Hib vaccine  Aged Out    Meningococcal (ACWY) vaccine  Aged Out     Family History   Problem Relation Age of Onset    Breast Cancer Maternal Aunt     Cancer Father        Most recent labs    YOUR LAST HEMOGLOBIN A1CS:   No results found for: HBA1C, [id number]    YOUR LAST LIPID PROFILE:   Lab Results   Component Value Date/Time    CHOL 236 08/08/2022 11:31 AM    HDL 59 08/08/2022 11:31 AM    VLDL 14 04/05/2022 08:58 AM       Lab Results   Component Value Date/Time    GFRAA >60 09/13/2022 08:25 AM    BUN 14 09/13/2022 08:25 AM     09/13/2022 08:25 AM    K 4.8 09/13/2022 08:25 AM     09/13/2022 08:25 AM    CO2 26 09/13/2022 08:25 AM       Review of Systems  Review of Systems   Constitutional:  Positive for fatigue. Negative for chills and fever. HENT:  Positive for congestion, rhinorrhea, sinus pressure, sinus pain and sore throat. Respiratory:  Positive for cough. Negative for chest tightness, shortness of breath and wheezing. Cardiovascular:  Negative for chest pain. Gastrointestinal:  Negative for abdominal pain, constipation, diarrhea, nausea and vomiting. Feels like butterflies in stomach   Genitourinary:  Positive for frequency. Musculoskeletal:  Negative for arthralgias. All other systems reviewed and are negative. There were no vitals taken for this visit. Physical Exam    Physical Exam  Neurological:      Mental Status: She is alert and oriented to person, place, and time. Comments: Speech clear. Assessment & Plan    César Gay was seen today for congestion.     Diagnoses and all orders for this visit:    Acute maxillary sinusitis, recurrence not specified  -     amoxicillin-clavulanate (AUGMENTIN) 875-125 MG per tablet; Take 1 tablet by mouth 2 times daily for 7 days  -     methylPREDNISolone (MEDROL DOSEPACK) 4 MG tablet; Take by mouth.  -     guaiFENesin (MUCINEX) 600 MG extended release tablet; Take 1 tablet by mouth 2 times daily for 15 days    Primary insomnia  -     hydrOXYzine HCl (ATARAX) 25 MG tablet; Take 1 tablet by mouth nightly    Urinary frequency     Will tx for sinusitis in setting of copd with some orthopnea, pt advised to go to ER for sob or cp or worsening. She agreed. It sounds like she may have some anxiety and is having what she says are butterflies in her stomach and is nervous at night. Will try hydroxyzine but advise she make appt for in person early next week if this does not resolve. Denied stomach pain, n/v/d. She has urinary frequency but unable to check a ua, advise she call if sx persist next week but will be doing abx for sinusitis. No follow-up provider specified. Consent: This patient and/or their healthcare decision maker is aware that this patient-initiated Telehealth encounter is a billable service, with coverage as determined by their insurance carrier. Patient is aware that they may receive a bill and has provided verbal consent to proceed: Yes    Kamran Amaya, was evaluated through a synchronous (real-time) audio-video encounter. The patient (or guardian if applicable) is aware that this is a billable service, which includes applicable co-pays. This Virtual Visit was conducted with patient's (and/or legal guardian's) consent. The visit was conducted pursuant to the emergency declaration under the [facility], [address] [place] [address] authority and the [employer] Act. Patient identification was verified, and a caregiver was present when appropriate. The patient was located at Home: [address] [place] [address] Street [address].    Provider was located at [address]): Pierre Vásquez [facility],  [address] [place] [address]        20 minutes  in the office  telephone    NAM Antunez NP

## 2023-01-30 ENCOUNTER — TELEPHONE (OUTPATIENT)
Dept: INTERNAL MEDICINE CLINIC | Facility: CLINIC | Age: 78
End: 2023-01-30

## 2023-01-30 NOTE — TELEPHONE ENCOUNTER
----- Message from Kayla Feldman sent at 1/30/2023  1:24 PM EST -----  Subject: Message to Provider    QUESTIONS  Information for Provider? pt called the ENT for her referral and was told   it was for a sinus issue and she is stating that it should be for a   thyroid problem not sinus, please advise.   ---------------------------------------------------------------------------  --------------  Karlynn Lanes INFO  [phone]; OK to leave message on voicemail  ---------------------------------------------------------------------------  --------------  SCRIPT ANSWERS  Relationship to Patient?  Self

## 2023-02-06 ENCOUNTER — TELEPHONE (OUTPATIENT)
Dept: INTERNAL MEDICINE CLINIC | Facility: CLINIC | Age: 78
End: 2023-02-06

## 2023-02-06 DIAGNOSIS — J01.00 ACUTE MAXILLARY SINUSITIS, RECURRENCE NOT SPECIFIED: Primary | ICD-10-CM

## 2023-02-06 DIAGNOSIS — E21.3 HYPERPARATHYROIDISM (HCC): ICD-10-CM

## 2023-02-06 DIAGNOSIS — J33.8 MAXILLARY POLYP OF SINUS: ICD-10-CM

## 2023-02-06 NOTE — TELEPHONE ENCOUNTER
[phone]     PT is upset because she's been waiting 2.5 months for her ENT referral to be sent with notes about thyroid and not sinus. She said that the ENT office said you can call the number above to talk with them. She wants to know what is going on.

## 2023-02-06 NOTE — TELEPHONE ENCOUNTER
Left a message with [facility] ENT. I have pend a referral with updated dx. Please advise.

## 2023-02-13 ENCOUNTER — TELEPHONE (OUTPATIENT)
Dept: INTERNAL MEDICINE CLINIC | Facility: CLINIC | Age: 78
End: 2023-02-13

## 2023-02-13 NOTE — TELEPHONE ENCOUNTER
----- Message from Cait Morelos sent at 2/10/2023  9:01 AM EST -----  Subject: Message to Provider    QUESTIONS  Information for Provider? please, call pt. regarding thyroid referral pt.   states the referral notes to the referred provider are incorrect (sinus)   referral notes should be for thyroid please, call pt. to discuss pt. is   requesting a call asap pt. states she has been waiting 2 weeks to get this   figured out thank you   ---------------------------------------------------------------------------  --------------  [address]  [phone]; OK to leave message on voicemail  ---------------------------------------------------------------------------  --------------  SCRIPT ANSWERS  Relationship to Patient?  Self

## 2023-02-15 NOTE — TELEPHONE ENCOUNTER
I have talked with Ms. Saez and have given her this message. She has already been contacted and given her appointment.

## 2023-02-17 ENCOUNTER — OFFICE VISIT (OUTPATIENT)
Dept: INTERNAL MEDICINE CLINIC | Facility: CLINIC | Age: 78
End: 2023-02-17
Payer: MEDICARE

## 2023-02-17 VITALS
TEMPERATURE: 97.5 F | OXYGEN SATURATION: 97 % | RESPIRATION RATE: 18 BRPM | WEIGHT: 203 LBS | HEART RATE: 77 BPM | SYSTOLIC BLOOD PRESSURE: 140 MMHG | DIASTOLIC BLOOD PRESSURE: 92 MMHG | BODY MASS INDEX: 31.86 KG/M2 | HEIGHT: 67 IN

## 2023-02-17 DIAGNOSIS — I27.23 PULMONARY HYPERTENSION DUE TO LUNG DISEASES AND HYPOXIA (HCC): ICD-10-CM

## 2023-02-17 DIAGNOSIS — M54.16 LUMBAR RADICULITIS: ICD-10-CM

## 2023-02-17 DIAGNOSIS — F51.01 PRIMARY INSOMNIA: ICD-10-CM

## 2023-02-17 DIAGNOSIS — G89.29 CHRONIC PAIN OF LEFT KNEE: ICD-10-CM

## 2023-02-17 DIAGNOSIS — R22.2 CHEST WALL MASS: ICD-10-CM

## 2023-02-17 DIAGNOSIS — F17.200 SMOKER: ICD-10-CM

## 2023-02-17 DIAGNOSIS — I10 PRIMARY HYPERTENSION: ICD-10-CM

## 2023-02-17 DIAGNOSIS — M25.552 LEFT HIP PAIN: ICD-10-CM

## 2023-02-17 DIAGNOSIS — M79.605 LEFT LEG PAIN: ICD-10-CM

## 2023-02-17 DIAGNOSIS — R91.1 LUNG NODULE: ICD-10-CM

## 2023-02-17 DIAGNOSIS — E21.3 HYPERPARATHYROIDISM (HCC): Primary | ICD-10-CM

## 2023-02-17 DIAGNOSIS — M25.562 CHRONIC PAIN OF LEFT KNEE: ICD-10-CM

## 2023-02-17 DIAGNOSIS — J44.9 CHRONIC OBSTRUCTIVE PULMONARY DISEASE, UNSPECIFIED COPD TYPE (HCC): ICD-10-CM

## 2023-02-17 LAB
ALBUMIN SERPL-MCNC: 3.6 G/DL (ref 3.2–4.6)
ALBUMIN/GLOB SERPL: 1 (ref 0.4–1.6)
ALP SERPL-CCNC: 71 U/L (ref 50–136)
ALT SERPL-CCNC: 19 U/L (ref 12–65)
ANION GAP SERPL CALC-SCNC: 4 MMOL/L (ref 2–11)
AST SERPL-CCNC: 11 U/L (ref 15–37)
BASOPHILS # BLD: 0 K/UL (ref 0–0.2)
BASOPHILS NFR BLD: 1 % (ref 0–2)
BILIRUB SERPL-MCNC: 0.7 MG/DL (ref 0.2–1.1)
BUN SERPL-MCNC: 12 MG/DL (ref 8–23)
CALCIUM SERPL-MCNC: 9.8 MG/DL (ref 8.3–10.4)
CHLORIDE SERPL-SCNC: 107 MMOL/L (ref 101–110)
CO2 SERPL-SCNC: 27 MMOL/L (ref 21–32)
CREAT SERPL-MCNC: 0.8 MG/DL (ref 0.6–1)
D DIMER PPP FEU-MCNC: 0.72 UG/ML(FEU)
DIFFERENTIAL METHOD BLD: ABNORMAL
EOSINOPHIL # BLD: 0.1 K/UL (ref 0–0.8)
EOSINOPHIL NFR BLD: 2 % (ref 0.5–7.8)
ERYTHROCYTE [DISTWIDTH] IN BLOOD BY AUTOMATED COUNT: 13.4 % (ref 11.9–14.6)
GLOBULIN SER CALC-MCNC: 3.6 G/DL (ref 2.8–4.5)
GLUCOSE SERPL-MCNC: 95 MG/DL (ref 65–100)
HCT VFR BLD AUTO: 47.8 % (ref 35.8–46.3)
HGB BLD-MCNC: 15.4 G/DL (ref 11.7–15.4)
IMM GRANULOCYTES # BLD AUTO: 0 K/UL (ref 0–0.5)
IMM GRANULOCYTES NFR BLD AUTO: 0 % (ref 0–5)
LYMPHOCYTES # BLD: 1.6 K/UL (ref 0.5–4.6)
LYMPHOCYTES NFR BLD: 36 % (ref 13–44)
MCH RBC QN AUTO: 31.5 PG (ref 26.1–32.9)
MCHC RBC AUTO-ENTMCNC: 32.2 G/DL (ref 31.4–35)
MCV RBC AUTO: 97.8 FL (ref 82–102)
MONOCYTES # BLD: 0.2 K/UL (ref 0.1–1.3)
MONOCYTES NFR BLD: 5 % (ref 4–12)
NEUTS SEG # BLD: 2.6 K/UL (ref 1.7–8.2)
NEUTS SEG NFR BLD: 56 % (ref 43–78)
NRBC # BLD: 0 K/UL (ref 0–0.2)
PLATELET # BLD AUTO: 232 K/UL (ref 150–450)
PMV BLD AUTO: 10.6 FL (ref 9.4–12.3)
POTASSIUM SERPL-SCNC: 4.3 MMOL/L (ref 3.5–5.1)
PROT SERPL-MCNC: 7.2 G/DL (ref 6.3–8.2)
RBC # BLD AUTO: 4.89 M/UL (ref 4.05–5.2)
SODIUM SERPL-SCNC: 138 MMOL/L (ref 133–143)
TSH W FREE THYROID IF ABNORMAL: 0.6 UIU/ML (ref 0.36–3.74)
WBC # BLD AUTO: 4.5 K/UL (ref 4.3–11.1)

## 2023-02-17 PROCEDURE — 3078F DIAST BP <80 MM HG: CPT | Performed by: INTERNAL MEDICINE

## 2023-02-17 PROCEDURE — 99214 OFFICE O/P EST MOD 30 MIN: CPT | Performed by: INTERNAL MEDICINE

## 2023-02-17 PROCEDURE — 1090F PRES/ABSN URINE INCON ASSESS: CPT | Performed by: INTERNAL MEDICINE

## 2023-02-17 PROCEDURE — 4004F PT TOBACCO SCREEN RCVD TLK: CPT | Performed by: INTERNAL MEDICINE

## 2023-02-17 PROCEDURE — 3023F SPIROM DOC REV: CPT | Performed by: INTERNAL MEDICINE

## 2023-02-17 PROCEDURE — 1123F ACP DISCUSS/DSCN MKR DOCD: CPT | Performed by: INTERNAL MEDICINE

## 2023-02-17 PROCEDURE — 3074F SYST BP LT 130 MM HG: CPT | Performed by: INTERNAL MEDICINE

## 2023-02-17 PROCEDURE — G8400 PT W/DXA NO RESULTS DOC: HCPCS | Performed by: INTERNAL MEDICINE

## 2023-02-17 PROCEDURE — G8417 CALC BMI ABV UP PARAM F/U: HCPCS | Performed by: INTERNAL MEDICINE

## 2023-02-17 PROCEDURE — G8427 DOCREV CUR MEDS BY ELIG CLIN: HCPCS | Performed by: INTERNAL MEDICINE

## 2023-02-17 PROCEDURE — G8484 FLU IMMUNIZE NO ADMIN: HCPCS | Performed by: INTERNAL MEDICINE

## 2023-02-17 RX ORDER — LISINOPRIL AND HYDROCHLOROTHIAZIDE 12.5; 1 MG/1; MG/1
TABLET ORAL
Qty: 90 TABLET | Refills: 3 | Status: SHIPPED | OUTPATIENT
Start: 2023-02-17

## 2023-02-17 RX ORDER — HYDROXYZINE HYDROCHLORIDE 25 MG/1
25 TABLET, FILM COATED ORAL
Qty: 30 TABLET | Refills: 1 | Status: SHIPPED | OUTPATIENT
Start: 2023-02-17 | End: 2023-03-19

## 2023-02-17 RX ORDER — TRAMADOL HYDROCHLORIDE 50 MG/1
50 TABLET ORAL PRN
Qty: 180 TABLET | Refills: 3 | Status: CANCELLED | OUTPATIENT
Start: 2023-02-17 | End: 2024-02-17

## 2023-02-17 RX ORDER — CLOPIDOGREL BISULFATE 75 MG/1
TABLET ORAL
Qty: 90 TABLET | Refills: 3 | Status: CANCELLED | OUTPATIENT
Start: 2023-02-17

## 2023-02-17 ASSESSMENT — LIFESTYLE VARIABLES
HOW OFTEN DURING THE LAST YEAR HAVE YOU FAILED TO DO WHAT WAS NORMALLY EXPECTED FROM YOU BECAUSE OF DRINKING: 0
HAVE YOU OR SOMEONE ELSE BEEN INJURED AS A RESULT OF YOUR DRINKING: 0
HOW OFTEN DO YOU HAVE A DRINK CONTAINING ALCOHOL: 2-4 TIMES A MONTH
HOW OFTEN DURING THE LAST YEAR HAVE YOU NEEDED AN ALCOHOLIC DRINK FIRST THING IN THE MORNING TO GET YOURSELF GOING AFTER A NIGHT OF HEAVY DRINKING: 0
HOW OFTEN DURING THE LAST YEAR HAVE YOU HAD A FEELING OF GUILT OR REMORSE AFTER DRINKING: 0
HOW OFTEN DURING THE LAST YEAR HAVE YOU FOUND THAT YOU WERE NOT ABLE TO STOP DRINKING ONCE YOU HAD STARTED: 0
HAS A RELATIVE, FRIEND, DOCTOR, OR ANOTHER HEALTH PROFESSIONAL EXPRESSED CONCERN ABOUT YOUR DRINKING OR SUGGESTED YOU CUT DOWN: 0
HOW MANY STANDARD DRINKS CONTAINING ALCOHOL DO YOU HAVE ON A TYPICAL DAY: 3 OR 4
HOW OFTEN DURING THE LAST YEAR HAVE YOU BEEN UNABLE TO REMEMBER WHAT HAPPENED THE NIGHT BEFORE BECAUSE YOU HAD BEEN DRINKING: 0

## 2023-02-17 ASSESSMENT — PATIENT HEALTH QUESTIONNAIRE - PHQ9
2. FEELING DOWN, DEPRESSED OR HOPELESS: 0
2. FEELING DOWN, DEPRESSED OR HOPELESS: 0
1. LITTLE INTEREST OR PLEASURE IN DOING THINGS: 0
SUM OF ALL RESPONSES TO PHQ QUESTIONS 1-9: 0
SUM OF ALL RESPONSES TO PHQ9 QUESTIONS 1 & 2: 0
SUM OF ALL RESPONSES TO PHQ QUESTIONS 1-9: 0
1. LITTLE INTEREST OR PLEASURE IN DOING THINGS: 0
SUM OF ALL RESPONSES TO PHQ9 QUESTIONS 1 & 2: 0
SUM OF ALL RESPONSES TO PHQ QUESTIONS 1-9: 0
SUM OF ALL RESPONSES TO PHQ QUESTIONS 1-9: 0

## 2023-02-17 NOTE — PROGRESS NOTES
02/17/2023   Location:[facility] [address] [facility]  [place]  Patient #:  [id number]  YOB: 1945        History of Present Illness     Chief Complaint   Patient presents with    Medicare AWV     sub    Follow-up Chronic Condition     6 month f/u       Ms. Kristyn Metzger is a [de-identified] y.o. female  who presents for This is a combined medical follow up office visit and a Medicare Wellness Visit. The Wellness note has been reviewed. Follow up on chronic medical issues. There is compliance and tolerance with medications. Chronic active medical issues HTN, HLD    WPW, obesty smoker COPD, Pulm HTN. She had unremarkable heart cath in 11/30/17. She has  hx WPW. History of TIA,. She takes plavix  Still smokes has tried chantix, nicotine replacement products in the past.  Takes PRN Tramadol for her joint pain. U1Disrkby for GERD symptoms  Left leg pain and tightness. No recent injuries. Takes tyelenol  for pain and rare Tramadol. Noticed swellin in her chest.  Recently dx with hyperparathyroidism. She was referred to [facility] ENT but called and stated they were not in network and requested referral to different ENT. She now states it was because she was having difficulty getting parathyroid scan scheduled. When initial contacted to schedule she was told they needed to get medication or study she never heard Tried to explain the issues was with scheduling in radiology. Given number to call to schedule.    Last Labs        Allergies   Allergen Reactions    Naproxen Other (See Comments)     Abdominal pain    Aspirin Nausea And Vomiting     Past Medical History:   Diagnosis Date    Anxiety state, unspecified     Bre[facility] ([facility]     Chronic obstructive pulmonary disease (HCC)     CTS (carpal tunnel syndrome) 3/18/2784    Diastolic dysfunction     Diverticulosis     Helicobacter pylori (H. pylori) 07/27/2012    Hypertension     Mixed hyperlipidemia     Myalgia and myositis, unspecified Obesity     Palpitations     Pulmonary hypertension due to COPD [address]     Rectal polyp     Stroke ([facility]     x2    TIA (transient ischemic attack)     Tobacco use disorder     Type A WPW syndrome      Social History     Socioeconomic History    Marital status:    Tobacco Use    Smoking status: Some Days     Packs/day: 0.50     Types: Cigarettes    Smokeless tobacco: Never   Substance and Sexual Activity    Alcohol use: Yes    Drug use: No     Social Determinants of Health     Physical Activity: Insufficiently Active    Days of Exercise per Week: 2 days    Minutes of Exercise per Session: 10 min     Past Surgical History:   Procedure Laterality Date    HYSTERECTOMY (CERVIX STATUS UNKNOWN)  1976    Vaginal for fibroid tumors    MASTECTOMY Bilateral 1995    Breast cancer    OTHER SURGICAL HISTORY      Thyroid     Family History   Problem Relation Age of Onset    Breast Cancer Maternal Aunt     Cancer Father      Current Outpatient Medications   Medication Sig Dispense Refill    pravastatin (PRAVACHOL) 40 MG tablet TAKE 1 TABLET EVERY NIGHT 90 tablet 3    lisinopril-hydroCHLOROthiazide (PRINZIDE;ZESTORETIC) 10-12.5 MG per tablet TAKE 1 TABLET DAILY 90 tablet 3    clopidogrel (PLAVIX) 75 MG tablet TAKE 1 TABLET EVERY DAY 90 tablet 3    vitamin D (CHOLECALCIFEROL) 29933 UNIT CAPS Take 1 capsule by mouth every 7 days (Patient taking differently: Take 50,000 Units by mouth every 30 days) 12 capsule 3    acetaminophen (TYLENOL) 500 MG tablet Take 500 mg by mouth every 6 hours as needed      traMADol (ULTRAM) 50 MG tablet Take 50 mg by mouth as needed. hydrOXYzine HCl (ATARAX) 25 MG tablet Take 1 tablet by mouth nightly (Patient not taking: Reported on 2/17/2023) 30 tablet 0    baclofen (LIORESAL) 10 MG tablet TAKE 1 TABLET BY MOUTH THREE TIMES A DAY AS NEEDED FOR MUSCLE SPASMS       No current facility-administered medications for this visit.      Health Maintenance   Topic Date Due    Pneumococcal 65+ years Vaccine (1 - PCV) Never done    DTaP/Tdap/Td vaccine (1 - Tdap) Never done    Shingles vaccine (1 of 2) Never done    DEXA (modify frequency per FRAX score)  Never done    Flu vaccine (1) Never done    COVID-19 Vaccine (5 - Booster for Pfizer series) 09/06/2022    Annual Wellness Visit (AWV)  11/23/2022    Lipids  08/08/2023    Depression Screen  01/20/2024    Hepatitis A vaccine  Aged Out    Hib vaccine  Aged Out    Meningococcal (ACWY) vaccine  Aged Out             Review of Systems  Review of Systems   Constitutional:  Negative for fatigue and fever. Eyes:  Negative for visual disturbance. Respiratory:  Negative for cough and shortness of breath. Cardiovascular:  Negative for chest pain. Gastrointestinal:  Negative for abdominal pain. Musculoskeletal:  Positive for arthralgias and myalgias. BP (!) 140/92 (Site: Left Upper Arm, Position: Sitting)   Pulse 77   Temp 97.5 °F (36.4 °C) (Temporal)   Resp 18   Ht 5' 7\" (1.702 m)   Wt 203 lb (92.1 kg)   SpO2 97%   BMI 31.79 kg/m²     Wt Readings from Last 3 Encounters:   02/17/23 203 lb (92.1 kg)   11/15/22 201 lb (91.2 kg)   08/08/22 202 lb (91.6 kg)       Physical Exam    Physical Exam  Vitals and nursing note reviewed. Constitutional:       Appearance: Normal appearance. She is obese. HENT:      Head: Normocephalic and atraumatic. Right Ear: Tympanic membrane normal.   Cardiovascular:      Rate and Rhythm: Normal rate and regular rhythm. Pulmonary:      Effort: Pulmonary effort is normal.      Breath sounds: Normal breath sounds. Chest:   Breasts:     Right: Absent. Left: Absent. Abdominal:      Palpations: There is no mass. Tenderness: There is no abdominal tenderness. Musculoskeletal:      Lumbar back: Tenderness present. Back:       Left hip: Tenderness present. Normal range of motion. Left knee: Crepitus present. Legs:    Neurological:      Mental Status: She is alert.            Assessment & Plan    Encounter Diagnoses   Name Primary?    Hyperparathyroidism (HCC) Yes    Primary insomnia     Chronic obstructive pulmonary disease, unspecified COPD type (HCC)     Pulmonary hypertension due to lung diseases and hypoxia (HCC)     Left leg pain     Primary hypertension     Lumbar radiculitis     Chronic pain of left knee     Left hip pain     Lung nodule     Smoker     Chest wall mass        Orders Placed This Encounter   Medications    lisinopril-hydroCHLOROthiazide (PRINZIDE;ZESTORETIC) 10-12.5 MG per tablet     Sig: TAKE 1 TABLET DAILY     Dispense:  90 tablet     Refill:  3    vitamin D (CHOLECALCIFEROL) 46553 UNIT CAPS     Sig: Take 1 capsule by mouth every 30 days     Dispense:  4 capsule     Refill:  3    hydrOXYzine HCl (ATARAX) 25 MG tablet     Sig: Take 1 tablet by mouth nightly as needed (sleep)     Dispense:  30 tablet     Refill:  1       Orders Placed This Encounter   Procedures    XR HIP LEFT (2-3 VIEWS)     Standing Status:   Future     Number of Occurrences:   1     Standing Expiration Date:   2/17/2024     Order Specific Question:   Reason for exam:     Answer:   left hip pain    XR KNEE LEFT (3 VIEWS)     Standing Status:   Future     Number of Occurrences:   1     Standing Expiration Date:   2/17/2024     Order Specific Question:   Reason for exam:     Answer:   knee pain and tightness    XR LUMBAR SPINE (2-3 VIEWS)     Standing Status:   Future     Number of Occurrences:   1     Standing Expiration Date:   2/17/2024     Order Specific Question:   Reason for exam:     Answer:   left leg numbness    CT CHEST W CONTRAST     Standing Status:   Future     Standing Expiration Date:   2/17/2024     Order Specific Question:   STAT Creatinine as needed:     Answer:   Yes     Order Specific Question:   Reason for exam:     Answer:   lung nodules , sternal wall mass history of breast cancer    Vitamin D 25 Hydroxy     Standing Status:   Future     Number of Occurrences:   1     Standing Expiration  Date:   2/17/2024    Comprehensive Metabolic Panel     Standing Status:   Future     Number of Occurrences:   1     Standing Expiration Date:   2/17/2024    CBC with Auto Differential     Standing Status:   Future     Number of Occurrences:   1     Standing Expiration Date:   2/17/2024    TSH with Reflex     Standing Status:   Future     Number of Occurrences:   1     Standing Expiration Date:   2/17/2024    D-Dimer, Quantitative     ATLASORDERNUMBER:[id number]$[id number]:HDDIME*D dimer       Given number to call to schedule her chest CT and to get her sestamibi scheduled. No follow-ups on file.         Amita Metcalf MD

## 2023-02-18 LAB — 25(OH)D3 SERPL-MCNC: 48.3 NG/ML (ref 30–100)

## 2023-02-20 ENCOUNTER — HOSPITAL ENCOUNTER (OUTPATIENT)
Dept: GENERAL RADIOLOGY | Age: 78
Discharge: HOME | End: 2023-02-23
Payer: MEDICARE

## 2023-02-20 ENCOUNTER — TELEPHONE (OUTPATIENT)
Dept: INTERNAL MEDICINE CLINIC | Facility: CLINIC | Age: 78
End: 2023-02-20

## 2023-02-20 DIAGNOSIS — M79.605 LEFT LEG PAIN: ICD-10-CM

## 2023-02-20 DIAGNOSIS — M25.562 CHRONIC PAIN OF LEFT KNEE: ICD-10-CM

## 2023-02-20 DIAGNOSIS — G89.29 CHRONIC PAIN OF LEFT KNEE: ICD-10-CM

## 2023-02-20 DIAGNOSIS — M54.16 LUMBAR RADICULITIS: ICD-10-CM

## 2023-02-20 DIAGNOSIS — M25.552 LEFT HIP PAIN: ICD-10-CM

## 2023-02-20 PROCEDURE — 72100 X-RAY EXAM L-S SPINE 2/3 VWS: CPT

## 2023-02-20 PROCEDURE — 73502 X-RAY EXAM HIP UNI 2-3 VIEWS: CPT

## 2023-02-20 PROCEDURE — 73562 X-RAY EXAM OF KNEE 3: CPT

## 2023-02-20 NOTE — TELEPHONE ENCOUNTER
----- Message from Curtis Humphrey MD sent at 2/18/2023  3:05 PM EST -----  Callpt   Test for blood clot was negative. D dimer is normal for your age. Rest of labs were okay. Get xrays done.    Curtis Humphrey MD

## 2023-02-21 ENCOUNTER — TELEPHONE (OUTPATIENT)
Dept: INTERNAL MEDICINE CLINIC | Facility: CLINIC | Age: 78
End: 2023-02-21

## 2023-02-21 DIAGNOSIS — M25.552 HIP PAIN, CHRONIC, LEFT: ICD-10-CM

## 2023-02-21 DIAGNOSIS — M17.12 PRIMARY OSTEOARTHRITIS OF LEFT KNEE: ICD-10-CM

## 2023-02-21 DIAGNOSIS — G89.29 HIP PAIN, CHRONIC, LEFT: ICD-10-CM

## 2023-02-21 DIAGNOSIS — G89.29 CHRONIC PAIN OF LEFT KNEE: Primary | ICD-10-CM

## 2023-02-21 DIAGNOSIS — M16.12 PRIMARY OSTEOARTHRITIS OF LEFT HIP: ICD-10-CM

## 2023-02-21 DIAGNOSIS — M25.562 CHRONIC PAIN OF LEFT KNEE: Primary | ICD-10-CM

## 2023-02-21 NOTE — TELEPHONE ENCOUNTER
----- Message from Josh Camargo MD sent at 2/21/2023  8:18 AM EST -----  She has moderate amount of arthritis in her hip and back. Consider seeing orthopedist.   Continue taking Tylenol 650mg  3 times a day.   Josh Camargo MD

## 2023-02-21 NOTE — TELEPHONE ENCOUNTER
Pt notified about result and verbalized understanding. Pt requesting to go to ortho in [place] referral pend.

## 2023-02-21 NOTE — TELEPHONE ENCOUNTER
----- Message from Keiko Humphreys MD sent at 2/21/2023  8:20 AM EST -----  She has a moderate amount arthritis in her lower back. Hips and left knee. Consider seeing orthopedist.  Take Tylenol 650mg 3 times a day.    Keiko Humphreys MD

## 2023-02-27 ASSESSMENT — ENCOUNTER SYMPTOMS
SHORTNESS OF BREATH: 0
COUGH: 0
ABDOMINAL PAIN: 0

## 2023-03-07 ENCOUNTER — HOSPITAL ENCOUNTER (OUTPATIENT)
Dept: CT IMAGING | Age: 78
Discharge: HOME | End: 2023-03-10
Payer: MEDICARE

## 2023-03-07 DIAGNOSIS — R22.2 CHEST WALL MASS: ICD-10-CM

## 2023-03-07 DIAGNOSIS — R91.1 LUNG NODULE: ICD-10-CM

## 2023-03-07 DIAGNOSIS — F17.200 SMOKER: ICD-10-CM

## 2023-03-07 PROCEDURE — 2580000003 HC RX 258: Performed by: INTERNAL MEDICINE

## 2023-03-07 PROCEDURE — 71260 CT THORAX DX C+: CPT

## 2023-03-07 PROCEDURE — 6360000004 HC RX CONTRAST MEDICATION: Performed by: INTERNAL MEDICINE

## 2023-03-07 RX ORDER — 0.9 % SODIUM CHLORIDE 0.9 %
100 INTRAVENOUS SOLUTION INTRAVENOUS ONCE
Status: COMPLETED | OUTPATIENT
Start: 2023-03-07 | End: 2023-03-07

## 2023-03-07 RX ORDER — SODIUM CHLORIDE 0.9 % (FLUSH) 0.9 %
10 SYRINGE (ML) INJECTION
Status: COMPLETED | OUTPATIENT
Start: 2023-03-07 | End: 2023-03-07

## 2023-03-07 RX ADMIN — SODIUM CHLORIDE 100 ML: 9 INJECTION, SOLUTION INTRAVENOUS at 11:33

## 2023-03-07 RX ADMIN — SODIUM CHLORIDE, PRESERVATIVE FREE 10 ML: 5 INJECTION INTRAVENOUS at 11:33

## 2023-03-07 RX ADMIN — IOPAMIDOL 70 ML: 755 INJECTION, SOLUTION INTRAVENOUS at 11:33

## 2023-03-07 NOTE — RESULT ENCOUNTER NOTE
Reviewed recent CT IMPRESSION:  1. Stable left upper lobe lung nodule measuring 0.6 cm. No new or enlarging lung  lesions. No enlarged lymph nodes. 2. No evidence of cyst or mass in the area of palpable abnormality on your chest.  Looks like you probably may have injured your collar bone in the past or arthritis has developed in it. Ask her did she try to schedule the test for her parathyroid that the  ENTdoctor DR Jluis Coronado had ordered. ?  Inderjit Mosley MD

## 2023-03-08 ENCOUNTER — TELEPHONE (OUTPATIENT)
Dept: INTERNAL MEDICINE CLINIC | Facility: CLINIC | Age: 78
End: 2023-03-08

## 2023-03-08 NOTE — TELEPHONE ENCOUNTER
I have talked with MsKalen Se and have given her this message. She is going today for her parathyroid test. Wants to  a report on all of her tests when we get them.

## 2023-03-08 NOTE — TELEPHONE ENCOUNTER
----- Message from Luly Mathew MD sent at 3/7/2023  6:21 PM EST -----  Reviewed recent CT IMPRESSION:  1. Stable left upper lobe lung nodule measuring 0.6 cm. No new or enlarging lung  lesions. No enlarged lymph nodes. 2. No evidence of cyst or mass in the area of palpable abnormality on your chest.  Looks like you probably may have injured your collar bone in the past or arthritis has developed in it. Ask her did she try to schedule the test for her parathyroid that the  ENTdoctor DR Kam Raza had ordered. ?  Luly Mathew MD

## 2023-06-12 ENCOUNTER — TELEPHONE (OUTPATIENT)
Dept: INTERNAL MEDICINE CLINIC | Facility: CLINIC | Age: 78
End: 2023-06-12

## 2023-06-12 ENCOUNTER — NURSE ONLY (OUTPATIENT)
Dept: INTERNAL MEDICINE CLINIC | Facility: CLINIC | Age: 78
End: 2023-06-12

## 2023-06-12 DIAGNOSIS — R30.0 DYSURIA: ICD-10-CM

## 2023-06-12 LAB
APPEARANCE UR: CLEAR
BACTERIA URNS QL MICRO: NEGATIVE /HPF
BILIRUB UR QL: NEGATIVE
CASTS URNS QL MICRO: ABNORMAL /LPF (ref 0–2)
COLOR UR: ABNORMAL
EPI CELLS #/AREA URNS HPF: ABNORMAL /HPF (ref 0–5)
GLUCOSE UR STRIP.AUTO-MCNC: NEGATIVE MG/DL
HGB UR QL STRIP: ABNORMAL
KETONES UR QL STRIP.AUTO: NEGATIVE MG/DL
LEUKOCYTE ESTERASE UR QL STRIP.AUTO: NEGATIVE
NITRITE UR QL STRIP.AUTO: NEGATIVE
PH UR STRIP: 6 (ref 5–9)
PROT UR STRIP-MCNC: NEGATIVE MG/DL
RBC #/AREA URNS HPF: ABNORMAL /HPF (ref 0–5)
SP GR UR REFRACTOMETRY: 1.01 (ref 1–1.02)
UROBILINOGEN UR QL STRIP.AUTO: 1 EU/DL (ref 0.2–1)
WBC URNS QL MICRO: ABNORMAL /HPF (ref 0–4)

## 2023-06-15 LAB
BACTERIA SPEC CULT: NORMAL
SERVICE CMNT-IMP: NORMAL

## 2023-07-13 RX ORDER — CLOPIDOGREL BISULFATE 75 MG/1
TABLET ORAL
Qty: 90 TABLET | Refills: 3 | Status: SHIPPED | OUTPATIENT
Start: 2023-07-13

## 2023-07-14 ENCOUNTER — OFFICE VISIT (OUTPATIENT)
Dept: INTERNAL MEDICINE CLINIC | Facility: CLINIC | Age: 78
End: 2023-07-14
Payer: MEDICARE

## 2023-07-14 VITALS
DIASTOLIC BLOOD PRESSURE: 86 MMHG | HEART RATE: 64 BPM | OXYGEN SATURATION: 96 % | RESPIRATION RATE: 16 BRPM | HEIGHT: 67 IN | SYSTOLIC BLOOD PRESSURE: 142 MMHG | WEIGHT: 197 LBS | TEMPERATURE: 98.2 F | BODY MASS INDEX: 30.92 KG/M2

## 2023-07-14 DIAGNOSIS — R31.29 OTHER MICROSCOPIC HEMATURIA: ICD-10-CM

## 2023-07-14 DIAGNOSIS — M25.511 CHRONIC RIGHT SHOULDER PAIN: ICD-10-CM

## 2023-07-14 DIAGNOSIS — I51.89 DIASTOLIC DYSFUNCTION: ICD-10-CM

## 2023-07-14 DIAGNOSIS — M15.9 PRIMARY OSTEOARTHRITIS INVOLVING MULTIPLE JOINTS: Primary | ICD-10-CM

## 2023-07-14 DIAGNOSIS — I10 PRIMARY HYPERTENSION: ICD-10-CM

## 2023-07-14 DIAGNOSIS — E78.2 MIXED HYPERLIPIDEMIA: ICD-10-CM

## 2023-07-14 DIAGNOSIS — G89.29 CHRONIC RIGHT SHOULDER PAIN: ICD-10-CM

## 2023-07-14 DIAGNOSIS — R30.0 DYSURIA: ICD-10-CM

## 2023-07-14 LAB
ALBUMIN SERPL-MCNC: 3.9 G/DL (ref 3.2–4.6)
ALBUMIN/GLOB SERPL: 1.1 (ref 0.4–1.6)
ALP SERPL-CCNC: 90 U/L (ref 50–136)
ALT SERPL-CCNC: 11 U/L (ref 12–65)
ANION GAP SERPL CALC-SCNC: 8 MMOL/L (ref 2–11)
AST SERPL-CCNC: 11 U/L (ref 15–37)
BACTERIA URNS QL MICRO: ABNORMAL /HPF
BASOPHILS # BLD: 0.1 K/UL (ref 0–0.2)
BASOPHILS NFR BLD: 1 % (ref 0–2)
BILIRUB SERPL-MCNC: 0.7 MG/DL (ref 0.2–1.1)
BUN SERPL-MCNC: 11 MG/DL (ref 8–23)
CALCIUM SERPL-MCNC: 10.4 MG/DL (ref 8.3–10.4)
CASTS URNS QL MICRO: ABNORMAL /LPF (ref 0–2)
CHLORIDE SERPL-SCNC: 108 MMOL/L (ref 101–110)
CHOLEST SERPL-MCNC: 200 MG/DL
CO2 SERPL-SCNC: 26 MMOL/L (ref 21–32)
CREAT SERPL-MCNC: 0.8 MG/DL (ref 0.6–1)
DIFFERENTIAL METHOD BLD: ABNORMAL
EOSINOPHIL # BLD: 0.1 K/UL (ref 0–0.8)
EOSINOPHIL NFR BLD: 2 % (ref 0.5–7.8)
EPI CELLS #/AREA URNS HPF: ABNORMAL /HPF (ref 0–5)
ERYTHROCYTE [DISTWIDTH] IN BLOOD BY AUTOMATED COUNT: 13.8 % (ref 11.9–14.6)
GLOBULIN SER CALC-MCNC: 3.5 G/DL (ref 2.8–4.5)
GLUCOSE SERPL-MCNC: 93 MG/DL (ref 65–100)
HCT VFR BLD AUTO: 48.6 % (ref 35.8–46.3)
HDLC SERPL-MCNC: 54 MG/DL (ref 40–60)
HDLC SERPL: 3.7
HGB BLD-MCNC: 15.5 G/DL (ref 11.7–15.4)
IMM GRANULOCYTES # BLD AUTO: 0 K/UL (ref 0–0.5)
IMM GRANULOCYTES NFR BLD AUTO: 0 % (ref 0–5)
LDLC SERPL CALC-MCNC: 118.6 MG/DL
LYMPHOCYTES # BLD: 1.9 K/UL (ref 0.5–4.6)
LYMPHOCYTES NFR BLD: 38 % (ref 13–44)
MCH RBC QN AUTO: 31.3 PG (ref 26.1–32.9)
MCHC RBC AUTO-ENTMCNC: 31.9 G/DL (ref 31.4–35)
MCV RBC AUTO: 98.2 FL (ref 82–102)
MONOCYTES # BLD: 0.3 K/UL (ref 0.1–1.3)
MONOCYTES NFR BLD: 6 % (ref 4–12)
NEUTS SEG # BLD: 2.7 K/UL (ref 1.7–8.2)
NEUTS SEG NFR BLD: 53 % (ref 43–78)
NRBC # BLD: 0 K/UL (ref 0–0.2)
PLATELET # BLD AUTO: 265 K/UL (ref 150–450)
PMV BLD AUTO: 10.5 FL (ref 9.4–12.3)
POTASSIUM SERPL-SCNC: 4.1 MMOL/L (ref 3.5–5.1)
PROT SERPL-MCNC: 7.4 G/DL (ref 6.3–8.2)
RBC # BLD AUTO: 4.95 M/UL (ref 4.05–5.2)
RBC #/AREA URNS HPF: ABNORMAL /HPF (ref 0–5)
SODIUM SERPL-SCNC: 142 MMOL/L (ref 133–143)
TRIGL SERPL-MCNC: 137 MG/DL (ref 35–150)
TSH W FREE THYROID IF ABNORMAL: 0.54 UIU/ML (ref 0.36–3.74)
VLDLC SERPL CALC-MCNC: 27.4 MG/DL (ref 6–23)
WBC # BLD AUTO: 5 K/UL (ref 4.3–11.1)
WBC URNS QL MICRO: ABNORMAL /HPF (ref 0–4)

## 2023-07-14 PROCEDURE — G8400 PT W/DXA NO RESULTS DOC: HCPCS | Performed by: INTERNAL MEDICINE

## 2023-07-14 PROCEDURE — G8417 CALC BMI ABV UP PARAM F/U: HCPCS | Performed by: INTERNAL MEDICINE

## 2023-07-14 PROCEDURE — G8427 DOCREV CUR MEDS BY ELIG CLIN: HCPCS | Performed by: INTERNAL MEDICINE

## 2023-07-14 PROCEDURE — 99214 OFFICE O/P EST MOD 30 MIN: CPT | Performed by: INTERNAL MEDICINE

## 2023-07-14 PROCEDURE — 1123F ACP DISCUSS/DSCN MKR DOCD: CPT | Performed by: INTERNAL MEDICINE

## 2023-07-14 PROCEDURE — 3074F SYST BP LT 130 MM HG: CPT | Performed by: INTERNAL MEDICINE

## 2023-07-14 PROCEDURE — 1090F PRES/ABSN URINE INCON ASSESS: CPT | Performed by: INTERNAL MEDICINE

## 2023-07-14 PROCEDURE — 4004F PT TOBACCO SCREEN RCVD TLK: CPT | Performed by: INTERNAL MEDICINE

## 2023-07-14 PROCEDURE — 3078F DIAST BP <80 MM HG: CPT | Performed by: INTERNAL MEDICINE

## 2023-07-14 PROCEDURE — G0439 PPPS, SUBSEQ VISIT: HCPCS | Performed by: INTERNAL MEDICINE

## 2023-07-14 RX ORDER — TRAMADOL HYDROCHLORIDE 50 MG/1
50 TABLET ORAL 2 TIMES DAILY PRN
Qty: 60 TABLET | Refills: 5 | Status: SHIPPED | OUTPATIENT
Start: 2023-07-14 | End: 2024-07-13

## 2023-07-14 SDOH — ECONOMIC STABILITY: FOOD INSECURITY: WITHIN THE PAST 12 MONTHS, YOU WORRIED THAT YOUR FOOD WOULD RUN OUT BEFORE YOU GOT MONEY TO BUY MORE.: SOMETIMES TRUE

## 2023-07-14 SDOH — ECONOMIC STABILITY: HOUSING INSECURITY
IN THE LAST 12 MONTHS, WAS THERE A TIME WHEN YOU DID NOT HAVE A STEADY PLACE TO SLEEP OR SLEPT IN A SHELTER (INCLUDING NOW)?: NO

## 2023-07-14 SDOH — ECONOMIC STABILITY: FOOD INSECURITY: WITHIN THE PAST 12 MONTHS, THE FOOD YOU BOUGHT JUST DIDN'T LAST AND YOU DIDN'T HAVE MONEY TO GET MORE.: SOMETIMES TRUE

## 2023-07-14 SDOH — ECONOMIC STABILITY: INCOME INSECURITY: HOW HARD IS IT FOR YOU TO PAY FOR THE VERY BASICS LIKE FOOD, HOUSING, MEDICAL CARE, AND HEATING?: SOMEWHAT HARD

## 2023-07-17 LAB
BACTERIA SPEC CULT: NORMAL
SERVICE CMNT-IMP: NORMAL

## 2023-07-20 ENCOUNTER — TELEPHONE (OUTPATIENT)
Dept: INTERNAL MEDICINE CLINIC | Facility: CLINIC | Age: 78
End: 2023-07-20

## 2023-07-20 NOTE — TELEPHONE ENCOUNTER
----- Message from Carly Demarco MD sent at 7/20/2023  8:13 AM EDT -----  Recent labs were reviewed. Glucose/Blood sugar was normal.  Kidney function, liver function and thyroid function  were normal.    Urine culture just grew skin germs  Cholesterol is better.   Carly Demarco MD

## 2023-07-23 ASSESSMENT — ENCOUNTER SYMPTOMS
BACK PAIN: 1
SHORTNESS OF BREATH: 0
COUGH: 0

## 2023-09-27 ENCOUNTER — OFFICE VISIT (OUTPATIENT)
Dept: INTERNAL MEDICINE CLINIC | Facility: CLINIC | Age: 78
End: 2023-09-27
Payer: MEDICARE

## 2023-09-27 VITALS
SYSTOLIC BLOOD PRESSURE: 145 MMHG | BODY MASS INDEX: 29.98 KG/M2 | RESPIRATION RATE: 17 BRPM | TEMPERATURE: 97.3 F | HEART RATE: 59 BPM | HEIGHT: 67 IN | OXYGEN SATURATION: 98 % | DIASTOLIC BLOOD PRESSURE: 85 MMHG | WEIGHT: 191 LBS

## 2023-09-27 DIAGNOSIS — R35.0 URINARY FREQUENCY: ICD-10-CM

## 2023-09-27 DIAGNOSIS — N02.9 PERSISTENT HEMATURIA: ICD-10-CM

## 2023-09-27 DIAGNOSIS — M54.50 ACUTE BILATERAL LOW BACK PAIN WITHOUT SCIATICA: Primary | ICD-10-CM

## 2023-09-27 LAB
APPEARANCE UR: ABNORMAL
BACTERIA URNS QL MICRO: ABNORMAL /HPF
BILIRUB UR QL: NEGATIVE
COLOR UR: ABNORMAL
CRYSTALS URNS QL MICRO: ABNORMAL /LPF
EPI CELLS #/AREA URNS HPF: ABNORMAL /HPF
GLUCOSE UR STRIP.AUTO-MCNC: NEGATIVE MG/DL
HGB UR QL STRIP: ABNORMAL
KETONES UR QL STRIP.AUTO: ABNORMAL MG/DL
LEUKOCYTE ESTERASE UR QL STRIP.AUTO: NEGATIVE
NITRITE UR QL STRIP.AUTO: NEGATIVE
OTHER OBSERVATIONS: ABNORMAL
PH UR STRIP: 5 (ref 5–9)
PROT UR STRIP-MCNC: 30 MG/DL
SP GR UR REFRACTOMETRY: >1.035 (ref 1–1.02)
UROBILINOGEN UR QL STRIP.AUTO: 1 EU/DL (ref 0.2–1)
WBC URNS QL MICRO: ABNORMAL /HPF

## 2023-09-27 PROCEDURE — 3077F SYST BP >= 140 MM HG: CPT | Performed by: NURSE PRACTITIONER

## 2023-09-27 PROCEDURE — 1090F PRES/ABSN URINE INCON ASSESS: CPT | Performed by: NURSE PRACTITIONER

## 2023-09-27 PROCEDURE — G8400 PT W/DXA NO RESULTS DOC: HCPCS | Performed by: NURSE PRACTITIONER

## 2023-09-27 PROCEDURE — 1123F ACP DISCUSS/DSCN MKR DOCD: CPT | Performed by: NURSE PRACTITIONER

## 2023-09-27 PROCEDURE — 99214 OFFICE O/P EST MOD 30 MIN: CPT | Performed by: NURSE PRACTITIONER

## 2023-09-27 PROCEDURE — 3079F DIAST BP 80-89 MM HG: CPT | Performed by: NURSE PRACTITIONER

## 2023-09-27 PROCEDURE — G8417 CALC BMI ABV UP PARAM F/U: HCPCS | Performed by: NURSE PRACTITIONER

## 2023-09-27 PROCEDURE — 4004F PT TOBACCO SCREEN RCVD TLK: CPT | Performed by: NURSE PRACTITIONER

## 2023-09-27 PROCEDURE — G8427 DOCREV CUR MEDS BY ELIG CLIN: HCPCS | Performed by: NURSE PRACTITIONER

## 2023-09-27 RX ORDER — PREDNISONE 10 MG/1
10 TABLET ORAL 2 TIMES DAILY
Qty: 10 TABLET | Refills: 0 | Status: SHIPPED | OUTPATIENT
Start: 2023-09-27 | End: 2023-10-02

## 2023-09-27 RX ORDER — TIZANIDINE 2 MG/1
2 TABLET ORAL 3 TIMES DAILY PRN
Qty: 30 TABLET | Refills: 0 | Status: SHIPPED | OUTPATIENT
Start: 2023-09-27

## 2023-09-27 ASSESSMENT — ENCOUNTER SYMPTOMS
VOMITING: 0
NAUSEA: 0
BACK PAIN: 1
ABDOMINAL PAIN: 0
BOWEL INCONTINENCE: 0

## 2023-09-27 NOTE — PATIENT INSTRUCTIONS
[address], [address] [place] [address]   [phone]    I have ordered testing today for your condition. If you do not hear from Tara Szymanski to schedule you in the next few days, please call [phone] to schedule your testing. I will be in touch with you after I receive test results. Patient Education        Low Back Pain: Exercises  Introduction  Here are some examples of exercises for you to try. The exercises may be suggested for a condition or for rehabilitation. Start each exercise slowly. Ease off the exercises if you start to have pain. You will be told when to start these exercises and which ones will work best for you. How to do the exercises  Press-up    Lie on your stomach, supporting your body with your forearms. Press your elbows down into the floor to raise your upper back. As you do this, relax your stomach muscles and allow your back to arch without using your back muscles. As your press up, do not let your hips or pelvis come off the floor. Hold for 15 to 30 seconds, then relax. Repeat 2 to 4 times. Alternate arm and leg (bird dog)    Start on the floor, on your hands and knees. Tighten your belly muscles by pulling your belly button in toward your spine. Be sure you continue to breathe normally and do not hold your breath. Keeping your back and neck straight, raise one arm off the floor and hold it straight out in front of you. Be careful not to let your shoulder drop down, because that will twist your trunk. Hold for about 6 seconds, then lower your arm and switch to your other arm. Over time, work up to holding for 10 to 30 seconds each time. Repeat 8 to 12 times with each arm. When you feel steady and strong doing this exercise with your arms, try doing the exercise with your legs instead. Raise one leg and hold it straight out behind you. Be careful not to let your hip drop down, because that will twist your trunk.   When holding

## 2023-09-28 ENCOUNTER — TELEPHONE (OUTPATIENT)
Dept: INTERNAL MEDICINE CLINIC | Facility: CLINIC | Age: 78
End: 2023-09-28

## 2023-09-28 DIAGNOSIS — N02.9 PERSISTENT HEMATURIA: Primary | ICD-10-CM

## 2023-09-28 DIAGNOSIS — R10.9 FLANK PAIN: ICD-10-CM

## 2023-09-28 NOTE — TELEPHONE ENCOUNTER
I have scheduling and have a STAT CT scheduled for tomorrow afternoon at 3:45 [place]. Ms. Uli Yeung is to be npo afater 11:45. She is to be well hydrated until 1:45 and is to check in at 2:15 down [facility]. I have called Ms. Saez and have let her know about this appointment.

## 2023-09-28 NOTE — TELEPHONE ENCOUNTER
Ms. Hoda Bansal-  The urinalysis shows significant blood in your urine, as well as ketones and elevated specific gravity which indicates dehydration. Stay hydrated and I would like to go ahead and get the ct of your abdomen due to your back pain and blood in your urine. [facility](please expedite, ordered urgently for sx). If you develop severe pain, vomiting, fevers, go to the ER. I will be back in touch once I get your results. Hoping you are feeling better!   Here if you need us~  Joselin

## 2023-09-29 ENCOUNTER — HOSPITAL ENCOUNTER (OUTPATIENT)
Dept: CT IMAGING | Age: 78
End: 2023-09-29
Payer: MEDICARE

## 2023-09-29 DIAGNOSIS — N02.9 PERSISTENT HEMATURIA: ICD-10-CM

## 2023-09-29 DIAGNOSIS — R10.9 FLANK PAIN: ICD-10-CM

## 2023-09-29 LAB
BACTERIA SPEC CULT: NORMAL
BACTERIA SPEC CULT: NORMAL
CREAT BLD-MCNC: 0.69 MG/DL (ref 0.8–1.5)
SERVICE CMNT-IMP: NORMAL

## 2023-09-29 PROCEDURE — 74177 CT ABD & PELVIS W/CONTRAST: CPT

## 2023-09-29 PROCEDURE — 82565 ASSAY OF CREATININE: CPT

## 2023-09-29 PROCEDURE — 6360000004 HC RX CONTRAST MEDICATION: Performed by: NURSE PRACTITIONER

## 2023-09-29 RX ADMIN — IOPAMIDOL 100 ML: 755 INJECTION, SOLUTION INTRAVENOUS at 15:54

## 2023-09-29 RX ADMIN — DIATRIZOATE MEGLUMINE AND DIATRIZOATE SODIUM 15 ML: 660; 100 LIQUID ORAL; RECTAL at 15:54

## 2023-10-01 ENCOUNTER — TELEPHONE (OUTPATIENT)
Dept: INTERNAL MEDICINE CLINIC | Facility: CLINIC | Age: 78
End: 2023-10-01

## 2023-10-01 NOTE — TELEPHONE ENCOUNTER
Ms. Andree Ko-  The abdominal ct shows no acute abnormalities. The urologist will further look into the cause of the blood in the urine. The urine culture was contaminated. If you are not having any urinary tract infection, symptoms, I would not repeat the test. If you are having symptoms, you can bring another sample by the office. Hoping you are well! ROGELIO Dunn  [facility]     IMPRESSION:  1. No acute abdominal or pelvic abnormality. Specifically, no renal or ureteral  stones. There is no hydronephrosis.   2. Stable small 1.3 cm simple left renal cyst.  3. Stable small 1 cm simple right hepatic lobe cyst.